# Patient Record
Sex: MALE | Race: WHITE | Employment: FULL TIME | URBAN - NONMETROPOLITAN AREA
[De-identification: names, ages, dates, MRNs, and addresses within clinical notes are randomized per-mention and may not be internally consistent; named-entity substitution may affect disease eponyms.]

---

## 2020-09-16 ENCOUNTER — HOSPITAL ENCOUNTER (EMERGENCY)
Age: 35
Discharge: HOME | End: 2020-09-16
Attending: EMERGENCY MEDICINE
Payer: COMMERCIAL

## 2020-09-16 VITALS
DIASTOLIC BLOOD PRESSURE: 112 MMHG | OXYGEN SATURATION: 99 % | BODY MASS INDEX: 37.03 KG/M2 | HEART RATE: 84 BPM | WEIGHT: 250 LBS | TEMPERATURE: 97.6 F | HEIGHT: 69 IN | SYSTOLIC BLOOD PRESSURE: 147 MMHG | RESPIRATION RATE: 17 BRPM

## 2020-09-16 PROCEDURE — 6370000000 HC RX 637 (ALT 250 FOR IP): Performed by: EMERGENCY MEDICINE

## 2020-09-16 PROCEDURE — 99282 EMERGENCY DEPT VISIT SF MDM: CPT

## 2020-09-16 RX ORDER — PREDNISONE 20 MG/1
40 TABLET ORAL ONCE
Status: COMPLETED | OUTPATIENT
Start: 2020-09-16 | End: 2020-09-16

## 2020-09-16 RX ORDER — METHYLPREDNISOLONE 4 MG/1
TABLET ORAL
Qty: 1 KIT | Refills: 0 | Status: ON HOLD | OUTPATIENT
Start: 2020-09-16 | End: 2020-09-24 | Stop reason: HOSPADM

## 2020-09-16 RX ADMIN — PREDNISONE 40 MG: 20 TABLET ORAL at 04:22

## 2020-09-16 ASSESSMENT — PAIN SCALES - GENERAL: PAINLEVEL_OUTOF10: 7

## 2020-09-16 ASSESSMENT — PAIN DESCRIPTION - PAIN TYPE: TYPE: ACUTE PAIN

## 2020-09-16 ASSESSMENT — PAIN DESCRIPTION - PROGRESSION: CLINICAL_PROGRESSION: NOT CHANGED

## 2020-09-16 ASSESSMENT — PAIN DESCRIPTION - DESCRIPTORS: DESCRIPTORS: ITCHING

## 2020-09-16 NOTE — ED PROVIDER NOTES
Emergency Department Attending Note    Ike Carrillo MD    Date of ED VIsit: 9/16/2020    CHIEF COMPLAINT  Poison Graciela (C/O \"playing in the woods Sunday, got poison Ivy all over\")      HISTORY OF PRESENT ILLNESS  Amy Gray is a 37 y.o. male  With Vital signs of There were no vitals taken for this visit. who presents to the ED with a complaint of poison ivy/itchy rash. Patient seen and evaluated in room 6. Patient states he got into the woods this weekend with his kids and thinks he may have contacted poison ivy. As the rash erupted on Monday and has been continuous since then he has not been able to sleep because of the itch. He is got no systemic symptoms with this. He is got a diffuse rash that is consistent with contact dermatitis that includes his face his groin his legs and his arms. Patient states he has had poison ivy before and this is exactly how it has looked in the past.  No shortness of breath. .  No other complaints, modifying factors or associated symptoms. Patients Past medical history reviewed and listed below  No past medical history on file. No past surgical history on file. I have reviewed the following from the nursing documentation. No family history on file.   Social History     Socioeconomic History    Marital status:      Spouse name: Not on file    Number of children: Not on file    Years of education: Not on file    Highest education level: Not on file   Occupational History    Not on file   Social Needs    Financial resource strain: Not on file    Food insecurity     Worry: Not on file     Inability: Not on file    Transportation needs     Medical: Not on file     Non-medical: Not on file   Tobacco Use    Smoking status: Current Every Day Smoker     Packs/day: 0.50   Substance and Sexual Activity    Alcohol use: No    Drug use: No    Sexual activity: Not on file   Lifestyle    Physical activity     Days per week: Not on file     Minutes per session: Not on file    Stress: Not on file   Relationships    Social connections     Talks on phone: Not on file     Gets together: Not on file     Attends Voodoo service: Not on file     Active member of club or organization: Not on file     Attends meetings of clubs or organizations: Not on file     Relationship status: Not on file    Intimate partner violence     Fear of current or ex partner: Not on file     Emotionally abused: Not on file     Physically abused: Not on file     Forced sexual activity: Not on file   Other Topics Concern    Not on file   Social History Narrative    Not on file     No current facility-administered medications for this encounter. Current Outpatient Medications   Medication Sig Dispense Refill    diclofenac (VOLTAREN) 50 MG EC tablet Take 1 tablet by mouth 2 times daily. 20 tablet 0     No Known Allergies    REVIEW OF SYSTEMS  10 systems reviewed, pertinent positives per HPI otherwise noted to be negative     PHYSICAL EXAM  There were no vitals taken for this visit. GENERAL APPEARANCE: Awake and alert. Cooperative. In no obvious distress. HEAD: Normocephalic. Atraumatic. EYES: PERRL. EOM's grossly intact. Slightly puffy  ENT: Mucous membranes are pink and moist.   NECK: Supple. HEART: RRR. No murmurs. LUNGS: Respirations unlabored. CTAB. Good air exchange. ABDOMEN: Soft. Non-distended. Non-tender. No masses. No organomegaly. No guarding or rebound. EXTREMITIES: No peripheral edema. Moves all extremities equally. All extremities neurovascularly intact. SKIN: Warm and dry. No acute rashes. Diffuse urticarial type contact dermatitis rash  NEUROLOGICAL: Alert and oriented. Strength 5/5, sensation intact. Gait normal.   PSYCHIATRIC: Normal mood and affect. No HI or SI expressed to me.     RADIOLOGY    If acquired see below     EKG:     If acquired see below       ED COURSE/MDM    Rash is definitely consistent with is the story is as well with contact dermatitis

## 2020-09-16 NOTE — LETTER
[facility] Emergency Department  [address] [address]  Phone: [phone]               September 16, 2020    Patient: Nazario Heard   YOB: 1985   Date of Visit: 9/16/2020       To Whom It May Concern:    Christine Moses was seen and treated in our emergency department on 9/16/2020. He may return to work on 9/17/20.       Sincerely,       Long Saldivar RN         Signature:__________________________________

## 2020-09-18 ENCOUNTER — HOSPITAL ENCOUNTER (INPATIENT)
Age: 35
LOS: 5 days | Discharge: HOME | DRG: 885 | End: 2020-09-24
Attending: STUDENT IN AN ORGANIZED HEALTH CARE EDUCATION/TRAINING PROGRAM | Admitting: PSYCHIATRY & NEUROLOGY
Payer: COMMERCIAL

## 2020-09-18 LAB
A/G RATIO: 1.6 (ref 1.1–2.2)
ACETAMINOPHEN LEVEL: <5 UG/ML (ref 10–30)
ALBUMIN SERPL-MCNC: 5.4 G/DL (ref 3.4–5)
ALP BLD-CCNC: 92 U/L (ref 40–129)
ALT SERPL-CCNC: 91 U/L (ref 10–40)
AMPHETAMINE SCREEN, URINE: NORMAL
ANION GAP SERPL CALCULATED.3IONS-SCNC: 16 MMOL/L (ref 3–16)
AST SERPL-CCNC: 44 U/L (ref 15–37)
BARBITURATE SCREEN URINE: NORMAL
BASOPHILS ABSOLUTE: 0.2 K/UL (ref 0–0.2)
BASOPHILS RELATIVE PERCENT: 1 %
BENZODIAZEPINE SCREEN, URINE: NORMAL
BILIRUB SERPL-MCNC: 0.4 MG/DL (ref 0–1)
BUN BLDV-MCNC: 8 MG/DL (ref 7–20)
CALCIUM SERPL-MCNC: 9.1 MG/DL (ref 8.3–10.6)
CANNABINOID SCREEN URINE: NORMAL
CHLORIDE BLD-SCNC: 98 MMOL/L (ref 99–110)
CO2: 20 MMOL/L (ref 21–32)
COCAINE METABOLITE SCREEN URINE: NORMAL
CREAT SERPL-MCNC: 0.9 MG/DL (ref 0.9–1.3)
EOSINOPHILS ABSOLUTE: 0.5 K/UL (ref 0–0.6)
EOSINOPHILS RELATIVE PERCENT: 2.9 %
ETHANOL: 185 MG/DL (ref 0–0.08)
GFR AFRICAN AMERICAN: >60
GFR NON-AFRICAN AMERICAN: >60
GLOBULIN: 3.3 G/DL
GLUCOSE BLD-MCNC: 110 MG/DL (ref 70–99)
HCT VFR BLD CALC: 45.1 % (ref 40.5–52.5)
HEMOGLOBIN: 15.1 G/DL (ref 13.5–17.5)
LYMPHOCYTES ABSOLUTE: 3.5 K/UL (ref 1–5.1)
LYMPHOCYTES RELATIVE PERCENT: 18.8 %
Lab: NORMAL
MCH RBC QN AUTO: 30 PG (ref 26–34)
MCHC RBC AUTO-ENTMCNC: 33.5 G/DL (ref 31–36)
MCV RBC AUTO: 89.6 FL (ref 80–100)
METHADONE SCREEN, URINE: NORMAL
MONOCYTES ABSOLUTE: 1.2 K/UL (ref 0–1.3)
MONOCYTES RELATIVE PERCENT: 6.3 %
NEUTROPHILS ABSOLUTE: 13.1 K/UL (ref 1.7–7.7)
NEUTROPHILS RELATIVE PERCENT: 71 %
OPIATE SCREEN URINE: NORMAL
OXYCODONE URINE: NORMAL
PDW BLD-RTO: 13.4 % (ref 12.4–15.4)
PH UA: 5
PHENCYCLIDINE SCREEN URINE: NORMAL
PLATELET # BLD: 342 K/UL (ref 135–450)
PMV BLD AUTO: 7.7 FL (ref 5–10.5)
POTASSIUM SERPL-SCNC: 3.4 MMOL/L (ref 3.5–5.1)
PROPOXYPHENE SCREEN: NORMAL
RBC # BLD: 5.03 M/UL (ref 4.2–5.9)
SALICYLATE, SERUM: <0.3 MG/DL (ref 15–30)
SODIUM BLD-SCNC: 134 MMOL/L (ref 136–145)
TOTAL PROTEIN: 8.7 G/DL (ref 6.4–8.2)
WBC # BLD: 18.4 K/UL (ref 4–11)

## 2020-09-18 PROCEDURE — G0480 DRUG TEST DEF 1-7 CLASSES: HCPCS

## 2020-09-18 PROCEDURE — 80307 DRUG TEST PRSMV CHEM ANLYZR: CPT

## 2020-09-18 PROCEDURE — 99285 EMERGENCY DEPT VISIT HI MDM: CPT

## 2020-09-18 PROCEDURE — 85025 COMPLETE CBC W/AUTO DIFF WBC: CPT

## 2020-09-18 PROCEDURE — 80053 COMPREHEN METABOLIC PANEL: CPT

## 2020-09-18 RX ORDER — POTASSIUM CHLORIDE 20 MEQ/1
20 TABLET, EXTENDED RELEASE ORAL ONCE
Status: COMPLETED | OUTPATIENT
Start: 2020-09-18 | End: 2020-09-19

## 2020-09-19 PROBLEM — D72.829 LEUCOCYTOSIS: Status: ACTIVE | Noted: 2020-09-19

## 2020-09-19 PROBLEM — R03.0 ELEVATED BLOOD-PRESSURE READING WITHOUT DIAGNOSIS OF HYPERTENSION: Status: ACTIVE | Noted: 2020-09-19

## 2020-09-19 PROBLEM — F32.A DEPRESSION: Status: ACTIVE | Noted: 2020-09-19

## 2020-09-19 PROBLEM — L23.7 POISON IVY DERMATITIS: Status: ACTIVE | Noted: 2020-09-19

## 2020-09-19 PROBLEM — F33.2 SEVERE EPISODE OF RECURRENT MAJOR DEPRESSIVE DISORDER, WITHOUT PSYCHOTIC FEATURES (HCC): Status: ACTIVE | Noted: 2020-09-19

## 2020-09-19 PROBLEM — Z00.00 ROUTINE GENERAL MEDICAL EXAMINATION AT A HEALTH CARE FACILITY: Status: ACTIVE | Noted: 2020-09-19

## 2020-09-19 PROBLEM — E87.6 HYPOKALEMIA: Status: ACTIVE | Noted: 2020-09-19

## 2020-09-19 LAB
ETHANOL: 65 MG/DL (ref 0–0.08)
SARS-COV-2, NAAT: NOT DETECTED
TSH SERPL DL<=0.05 MIU/L-ACNC: 2.2 UIU/ML (ref 0.27–4.2)

## 2020-09-19 PROCEDURE — G0480 DRUG TEST DEF 1-7 CLASSES: HCPCS

## 2020-09-19 PROCEDURE — 6370000000 HC RX 637 (ALT 250 FOR IP): Performed by: PSYCHIATRY & NEUROLOGY

## 2020-09-19 PROCEDURE — 36415 COLL VENOUS BLD VENIPUNCTURE: CPT

## 2020-09-19 PROCEDURE — 99222 1ST HOSP IP/OBS MODERATE 55: CPT | Performed by: PHYSICIAN ASSISTANT

## 2020-09-19 PROCEDURE — U0002 COVID-19 LAB TEST NON-CDC: HCPCS

## 2020-09-19 PROCEDURE — 1240000000 HC EMOTIONAL WELLNESS R&B

## 2020-09-19 PROCEDURE — 99223 1ST HOSP IP/OBS HIGH 75: CPT | Performed by: PSYCHIATRY & NEUROLOGY

## 2020-09-19 PROCEDURE — 84443 ASSAY THYROID STIM HORMONE: CPT

## 2020-09-19 PROCEDURE — 6360000002 HC RX W HCPCS: Performed by: PSYCHIATRY & NEUROLOGY

## 2020-09-19 PROCEDURE — 6370000000 HC RX 637 (ALT 250 FOR IP)

## 2020-09-19 PROCEDURE — 6370000000 HC RX 637 (ALT 250 FOR IP): Performed by: STUDENT IN AN ORGANIZED HEALTH CARE EDUCATION/TRAINING PROGRAM

## 2020-09-19 PROCEDURE — 6370000000 HC RX 637 (ALT 250 FOR IP): Performed by: EMERGENCY MEDICINE

## 2020-09-19 RX ORDER — TRAZODONE HYDROCHLORIDE 50 MG/1
50 TABLET ORAL NIGHTLY PRN
Status: DISCONTINUED | OUTPATIENT
Start: 2020-09-19 | End: 2020-09-24 | Stop reason: HOSPADM

## 2020-09-19 RX ORDER — ESCITALOPRAM OXALATE 10 MG/1
10 TABLET ORAL DAILY
Status: DISCONTINUED | OUTPATIENT
Start: 2020-09-19 | End: 2020-09-24 | Stop reason: HOSPADM

## 2020-09-19 RX ORDER — ACETAMINOPHEN 325 MG/1
650 TABLET ORAL EVERY 4 HOURS PRN
Status: DISCONTINUED | OUTPATIENT
Start: 2020-09-19 | End: 2020-09-24 | Stop reason: HOSPADM

## 2020-09-19 RX ORDER — BENZTROPINE MESYLATE 1 MG/ML
2 INJECTION INTRAMUSCULAR; INTRAVENOUS 2 TIMES DAILY PRN
Status: DISCONTINUED | OUTPATIENT
Start: 2020-09-19 | End: 2020-09-24 | Stop reason: HOSPADM

## 2020-09-19 RX ORDER — MAGNESIUM HYDROXIDE/ALUMINUM HYDROXICE/SIMETHICONE 120; 1200; 1200 MG/30ML; MG/30ML; MG/30ML
30 SUSPENSION ORAL EVERY 6 HOURS PRN
Status: DISCONTINUED | OUTPATIENT
Start: 2020-09-19 | End: 2020-09-24 | Stop reason: HOSPADM

## 2020-09-19 RX ORDER — METHYLPREDNISOLONE 4 MG/1
4 TABLET ORAL DAILY
Status: COMPLETED | OUTPATIENT
Start: 2020-09-19 | End: 2020-09-20

## 2020-09-19 RX ORDER — ONDANSETRON 4 MG/1
4 TABLET, ORALLY DISINTEGRATING ORAL ONCE
Status: COMPLETED | OUTPATIENT
Start: 2020-09-19 | End: 2020-09-19

## 2020-09-19 RX ORDER — OLANZAPINE 10 MG/1
10 INJECTION, POWDER, LYOPHILIZED, FOR SOLUTION INTRAMUSCULAR
Status: ACTIVE | OUTPATIENT
Start: 2020-09-19 | End: 2020-09-19

## 2020-09-19 RX ORDER — IBUPROFEN 400 MG/1
400 TABLET ORAL EVERY 6 HOURS PRN
Status: DISCONTINUED | OUTPATIENT
Start: 2020-09-19 | End: 2020-09-24 | Stop reason: HOSPADM

## 2020-09-19 RX ORDER — ONDANSETRON 4 MG/1
TABLET, ORALLY DISINTEGRATING ORAL
Status: COMPLETED
Start: 2020-09-19 | End: 2020-09-19

## 2020-09-19 RX ORDER — IBUPROFEN 600 MG/1
600 TABLET ORAL ONCE
Status: COMPLETED | OUTPATIENT
Start: 2020-09-19 | End: 2020-09-19

## 2020-09-19 RX ORDER — SILDENAFIL 100 MG/1
50 TABLET, FILM COATED ORAL DAILY PRN
Status: ON HOLD | COMMUNITY
Start: 2020-08-26 | End: 2020-09-24 | Stop reason: HOSPADM

## 2020-09-19 RX ORDER — FLUTICASONE PROPIONATE 50 MCG
2 SPRAY, SUSPENSION (ML) NASAL DAILY
Status: ON HOLD | COMMUNITY
Start: 2019-02-20 | End: 2020-09-24 | Stop reason: HOSPADM

## 2020-09-19 RX ORDER — METHYLPREDNISOLONE 4 MG/1
TABLET ORAL
Status: ON HOLD | COMMUNITY
Start: 2020-06-29 | End: 2020-09-24 | Stop reason: HOSPADM

## 2020-09-19 RX ORDER — BUPROPION HYDROCHLORIDE 75 MG/1
75 TABLET ORAL 2 TIMES DAILY
Status: ON HOLD | COMMUNITY
End: 2020-09-24 | Stop reason: HOSPADM

## 2020-09-19 RX ORDER — OLANZAPINE 10 MG/1
10 TABLET ORAL
Status: ACTIVE | OUTPATIENT
Start: 2020-09-19 | End: 2020-09-19

## 2020-09-19 RX ADMIN — ONDANSETRON 4 MG: 4 TABLET, ORALLY DISINTEGRATING ORAL at 04:43

## 2020-09-19 RX ADMIN — METHYLPREDNISOLONE 4 MG: 4 TABLET ORAL at 21:23

## 2020-09-19 RX ADMIN — ESCITALOPRAM OXALATE 10 MG: 10 TABLET ORAL at 17:36

## 2020-09-19 RX ADMIN — POTASSIUM CHLORIDE 20 MEQ: 1500 TABLET, EXTENDED RELEASE ORAL at 00:20

## 2020-09-19 RX ADMIN — IBUPROFEN 600 MG: 600 TABLET, FILM COATED ORAL at 04:41

## 2020-09-19 ASSESSMENT — LIFESTYLE VARIABLES
HISTORY_ALCOHOL_USE: NO
HISTORY_ALCOHOL_USE: NO

## 2020-09-19 ASSESSMENT — PATIENT HEALTH QUESTIONNAIRE - PHQ9
SUM OF ALL RESPONSES TO PHQ QUESTIONS 1-9: 5
SUM OF ALL RESPONSES TO PHQ QUESTIONS 1-9: 2

## 2020-09-19 ASSESSMENT — SLEEP AND FATIGUE QUESTIONNAIRES
DO YOU HAVE DIFFICULTY SLEEPING: NO
DO YOU USE A SLEEP AID: NO
AVERAGE NUMBER OF SLEEP HOURS: 8
AVERAGE NUMBER OF SLEEP HOURS: 8
DO YOU HAVE DIFFICULTY SLEEPING: NO
DO YOU USE A SLEEP AID: NO

## 2020-09-19 ASSESSMENT — PAIN SCALES - GENERAL: PAINLEVEL_OUTOF10: 9

## 2020-09-19 NOTE — ED NOTES
Call placed to Dr. Chauncy Hamman to update him on collateral obtained by Hyun Serna. Message left awaiting call back.       Patric Ortiz RN  09/19/20 [id number]
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
## 2020-09-19 NOTE — ED NOTES
Patient take to [facility] per Sachi PONCE and Security.       Perico Hale RN  09/19/20 [id number]
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
## 2020-09-19 NOTE — ED NOTES
Report from Anne Selby RN this RN to assume care for this pt at this time      Ashley De La Vega RN  09/19/20 0363

## 2020-09-19 NOTE — BH NOTE
( ) Patient refused counseling  ( ) Patient has not smoked in the last 30 days    Metabolic Screening:    No results found for: LABA1C    No results found for: CHOL  No results found for: TRIG  No results found for: HDL  No components found for: LDLCAL  No results found for: LABVLDL      Body mass index is 36.92 kg/m². BP Readings from Last 2 Encounters:   09/19/20 (!) 140/94   09/16/20 (!) 147/112           Pt admitted with followings belongings:  Dentures: None  Vision - Corrective Lenses: None  Hearing Aid: None  Jewelry: None  Body Piercings Removed: N/A  Clothing: Pants, Shirt, Footwear  Were All Patient Medications Collected?: Not Applicable  Other Valuables: Cell phone, Wallet(61.00, sunglasses)     Patient oriented to surroundings and program expectations and copy of patient rights given. Received admission packet:  YES. Consents reviewed, signed YES Patient verbalize understanding:  YES.     Patient education on precautions: Rina Telles RN
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
## 2020-09-19 NOTE — ED NOTES
Patient bed ready on [facility]. Malina ARCHULETA updated about patient.       Jethro Butt RN  09/19/20 [id number]

## 2020-09-19 NOTE — FLOWSHEET NOTE
09/19/20 1128   Activities of Daily Living   Patient Requires assistance with daily self-care activities? No   Leisure Activity 1   3 Favorite Leisure Activities fishing   Frequency monthly   Last time in the last 3 months   Barriers to participating    (none)   Leisure Activity 2   [address]  spending time with my kids   Frequency  > 2 hours/day   Last time  this week   Barriers to participating    (none)   Leisure Activity 3   Favorite Leisure Activities  shooting my guns   Frequency  weekly   Last time  this week   Barriers to participating    (none reported)   Social   Patient reports spending the majority of their free time with a group   Patient verbalizes a preference for spending free time with a group   Patients perception of support system less healthy  (reports that his parents are \"hostile\" and not supportive)   Patients perception of barriers to socializing with others include(s) no perceived barriers   Social Details patient reports that he sees his children daily and also has friends who are supportive.  He is attempting to distance himself from his parents, who he states are \"hostile\"   Beliefs & Coping   Has difficulty dealing with feelings   Yes   Internalizes feelings/Keeps feelings in Yes   Externalizes feelings through aggressiveness or poor temper control  No   Feels uncomfortable around others  No   Has difficulty talking to others  No   Depends on others for direction or decisions No   Difficulty dealing with anger of others  No   Difficulty dealing with own anger  No   Difficulty managing stress No   Frequently has difficulty with relationships  No   Has recently perceived/experienced loss, disappointment, humiliation or failure  Yes  (patient is going through a divorce, which he has accepted, but he still wants to be able to see his children regularly)   General perception about self likes self   Attitude about abilities generally perceives abilities as: always NEPHROLOGY PROGRESS NOTE     PATIENT INFORMATION     Name: Jo Villanueva   Age & Sex: 56 y o  female   MRN: [id number]  Hospital Stay Days: 1  Unit/Bed#: -01   Encounter: [id number]    ASSESSMENT/PLAN     Principal Problem:    UTI (urinary tract infection)  Active Problems:    Acquired hypothyroidism    Controlled type 1 diabetes mellitus with neurological manifestations ([place] [address] )    Essential hypertension    Anemia    Status post kidney transplant    Major depressive disorder, recurrent episode, in full remission ([place] [address] )    Multiple myeloma not having achieved remission ([place] [address] )    Acute renal failure superimposed on stage 4 chronic kidney disease ([place] [address] )    Acute metabolic encephalopathy    Metabolic acidosis    EDUARDO POA on CKD stage 4, baseline creatinine 1 9 to 2 1, follows with Dr Yash Dalton  Likely prerenal etiology due to poor oral intake over the last 2 days complicated with recurrent UTI  Will likely need long term PPx due to several UTI episodes  -recently had an episode of EDUARDO with peak creatinine 4 4 on 9/9/19 improved to 2 8 on discharge  Creatinine continue to slowly improved to 2 7 yesterday and plateau today at 1 15  -EDUARDO recently thought to be multifactorial including prerenal/ATN/?  AIN; now current episode of UTI likely contributing as well  -start IV bicarb drip as below, repeat BMP today shows Bicarb 15, non anion gap MA  -avoid nephrotoxins or NSAIDs  -Post void bladder scan shows 51mL   -transplant kidney ultrasound this admission shows normal size, normal echogenicity, no hydronephrosis    Slightly increased resistive indices although renal artery and renal vein patent   -recent peripheral eosinophilia earlier this month now seems to have improved  -urinalysis suggestive of UTI along with her urinary complaint     Likely UTI based on urinalysis and her urinary complaint of dysuria, urinary incontinence on presentation   -given recurrent UTI episodes in immunocompromised status   -IV antibiotic 1g rocephin as per primary team, ID input appreciated     Low bicarb this could be secondary to presumed metabolic acidosis in the setting of advanced renal failure along with new dx of MM  -will start bicarb drip as mentioned below, will increase rate to 70 mL/hour  -Bicarb remains low at 15  Non anion gap metabolic acidosis, likely in the setting of confirmed dx of multiple myeloma (April 2019)  -Continue Bicarb for now     Anemia, closely monitor hemoglobin  -Hgb 8 4 yesterday, denies anemia related symptoms, no signs of active bleeding   -if transfusion needed, consider all transplant precautions  -f/u H/H today     Hypertension, chronic  -blood pressure remains elevated w SBP 150s  -outpatient regimen includes amlodipine 10 mg in a m , 5 mg in p m , clonidine 0 3 mg p o  T i d , doxazosin 4 mg daily, hydralazine 50 mg t i d , metoprolol 50 mg b i d   -restart all antihypertensive medications with holding parameter  -goal SBP 140s for now  -avoid hypotension      Status post renal and pancreas transplant in 1998  -chronic allograft nephropathy, currently minutes suppression includes Prograf 3 mg in a m  And 2 mg in p m  And prednisone 5 mg daily  -continue same  -monitor Prograf level in a m   -last tacrolimus level 3 4, goal level around 4  -pending level for today    SUBJECTIVE     Patient seen and examined  No acute events overnight  Pt is A& O x4 however mildly anxious (baseline as per pt)  Pt states that she is feeling much better today without complaints of burning or pain with urination  Denies blood in urine  Denies suprapubic tenderness  Admits to mild nausea  Also admits to constipation with last BM 4days ago  States that she takes dulcolax at home  Denies SOB, Cp, HA, blurred vision, abd pain, LE Edema/ pain  ROS negative for 12 organ systems except per HPI      OBJECTIVE     Vitals:    10/01/19 2214 10/02/19 0531 10/02/19 0548 10/02/19 0700   BP: 165/64 157/62  156/70   BP Location: successful   Locus of Control  always   Belief about recovery Recovery is possible   Patient Identified Strengths  \"My kids love me, I work\"   Patient Identified Limitations  \"I just need to do whatever it is that I need to in order to make sure my kids are ok and that I can have a relationship with them. I want to take care of myself\"   Perception of most stressful event prior to hospitalization Patient states that he made a poor choice when drinking two bottles of wine. He states that he was upset that he was not completely honest with a new female that he has been talking to and went outside to shoot his gun, but that was a bad decision. He states that he does not need to be hospitalized, but he is willing to do whatever it takes for his children to be ok and not scared of him   Perception of changes needed Patient states that he would like to get on an antidepressant that actually helps, to continue to have good relationships with and see his kids, and to maintain coparenting with his estranged wife     Writer completed leisure assessment. Patient reports that he enjoys shooting his guns, fishing, and spending time with his children. He works full time and is going through a divorce with his wife, but they have been able to be friends and to co-parent their children effectively. He states that he does not necessarily believe in God or [employer], just in [employer] [facility] a good person and raising your kids right. \" Patient has an internal locus of control and is hopeful about the future.      AZEEM Hernandez Right arm   Pulse: 65   61   Resp:    17   Temp: 98 °F (36 7 °C)   (!) 97 3 °F (36 3 °C)   TempSrc:    Oral   SpO2: 96%   99%   Weight:   100 kg (221 lb 5 5 oz)    Height:          Temperature:   Temp (24hrs), Av 1 °F (36 7 °C), Min:97 3 °F (36 3 °C), Max:99 °F (37 2 °C)    Temperature: (!) 97 3 °F (36 3 °C)  Intake & Output:  I/O        07 - 10/01 0700 10/01 0701 - 10/02 0700 10/02 07 - 10/03 0700    P  O   520     Total Intake(mL/kg)  520 (5 2)     Urine (mL/kg/hr)  250 150 (0 4)    Total Output  250 150    Net  +270 -150               Weights:   IBW: 63 9 kg    Body mass index is 33 65 kg/m²  Weight (last 2 days)     Date/Time   Weight    10/02/19 0548   100 (221 34)    10/01/19 17:14:56   99 3 (218 92)    10/01/19 1208   101 (222)            Physical Exam   Constitutional: She is oriented to person, place, and time  She appears well-developed and well-nourished  No distress  HENT:   Head: Normocephalic and atraumatic  Right Ear: External ear normal    Left Ear: External ear normal    Nose: Nose normal    Mouth/Throat: Oropharynx is clear and moist    Eyes: Pupils are equal, round, and reactive to light  Conjunctivae and EOM are normal    Neck: Normal range of motion  Neck supple  No JVD present  No tracheal deviation present  Cardiovascular: Normal rate, regular rhythm, normal heart sounds and intact distal pulses  Exam reveals no gallop and no friction rub  No murmur heard  Pulmonary/Chest: Effort normal and breath sounds normal  No stridor  No respiratory distress  She has no wheezes  She has no rales  She exhibits no tenderness  Abdominal: Soft  Bowel sounds are normal  She exhibits no distension  There is tenderness (mild tenderness to palpation on left side of lower abdomen )  Musculoskeletal: Normal range of motion  She exhibits no edema, tenderness or deformity  Lymphadenopathy:     She has no cervical adenopathy     Neurological: She is alert and oriented to person, place, and time  No cranial nerve deficit  Coordination normal    Skin: Skin is warm and dry  Capillary refill takes less than 2 seconds  No rash noted  She is not diaphoretic  No erythema  No pallor  Psychiatric: Her behavior is normal  Judgment and thought content normal    Mildly anxious   Nursing note and vitals reviewed  LABORATORY DATA     Labs: I have personally reviewed pertinent reports  Results from last 7 days   Lab Units 10/01/19  1222   WBC Thousand/uL 8 58   HEMOGLOBIN g/dL 8 4*   HEMATOCRIT % 27 6*   PLATELETS Thousands/uL 168   NEUTROS PCT % 74   MONOS PCT % 5      Results from last 7 days   Lab Units 10/02/19  0444 10/01/19  1222   POTASSIUM mmol/L 4 5 4 6   CHLORIDE mmol/L 117* 118*   CO2 mmol/L 15* 15*   BUN mg/dL 36* 38*   CREATININE mg/dL 2 75* 2 70*   CALCIUM mg/dL 8 7 9 2   ALK PHOS U/L  --  119*   ALT U/L  --  15   AST U/L  --  8     Results from last 7 days   Lab Units 10/02/19  0444   MAGNESIUM mg/dL 2 1     Results from last 7 days   Lab Units 10/02/19  0444   PHOSPHORUS mg/dL 4 4          Results from last 7 days   Lab Units 10/01/19  1222   LACTIC ACID mmol/L 0 5           IMAGING & DIAGNOSTIC TESTING     Radiology Results: I have personally reviewed pertinent reports  Us Transplant Kidney With Doppler    Result Date: 10/1/2019  Impression: Arterial resistive indices within the transplant lower pole renal parenchyma and transplant renal artery measuring between 0 8 and 0 9, previously resistive indices measuring up to 0 8 in early September  Transplant renal artery and transplant renal vein are patent  Spectral analysis of the transplant renal artery does not suggest stenosis  Findings are more concerning for developing transplant rejection  No hydronephrosis  Simple renal cysts within both the left pelvic transplant kidney and right native kidney  Left native kidney not reliably identified,  known to be significantly atrophic  The study was marked in [facility] for immediate notification  Workstation performed: [id number]     Other Diagnostic Testing: I have personally reviewed pertinent reports        ACTIVE MEDICATIONS     Current Facility-Administered Medications   Medication Dose Route Frequency    acetaminophen (TYLENOL) tablet 650 mg  650 mg Oral Q6H PRN    amLODIPine (NORVASC) tablet 10 mg  10 mg Oral QAM    amLODIPine (NORVASC) tablet 5 mg  5 mg Oral QPM    ARIPiprazole (ABILIFY) tablet 30 mg  30 mg Oral HS    aspirin chewable tablet 81 mg  81 mg Oral Daily    bisacodyl (DULCOLAX) EC tablet 5 mg  5 mg Oral Daily PRN    busPIRone (BUSPAR) tablet 5 mg  5 mg Oral BID    calcium carbonate (TUMS) chewable tablet 1,000 mg  1,000 mg Oral Daily PRN    cefTRIAXone (ROCEPHIN) 1,000 mg in dextrose 5 % 50 mL IVPB  1,000 mg Intravenous Q24H    cloNIDine (CATAPRES) tablet 0 3 mg  0 3 mg Oral Q8H [address]    doxazosin (CARDURA) tablet 4 mg  4 mg Oral HS    DULoxetine (CYMBALTA) delayed release capsule 60 mg  60 mg Oral BID    ferrous sulfate tablet 325 mg  325 mg Oral Daily With Breakfast    folic acid (FOLVITE) tablet 1,000 mcg  1,000 mcg Oral Daily    heparin (porcine) subcutaneous injection 5,000 Units  5,000 Units Subcutaneous Q8H [address]    hydrALAZINE (APRESOLINE) tablet 50 mg  50 mg Oral Q8H [address]    insulin glargine (LANTUS) subcutaneous injection 1 Units 0 01 mL  1 Units Subcutaneous HS    insulin lispro (HumaLOG) 100 units/mL subcutaneous injection 1-5 Units  1-5 Units Subcutaneous HS    insulin lispro (HumaLOG) 100 units/mL subcutaneous injection 1-6 Units  1-6 Units Subcutaneous TID AC    levothyroxine tablet 125 mcg  125 mcg Oral Early Morning    LORazepam (ATIVAN) tablet 2 mg  2 mg Oral TID PRN    metoprolol tartrate (LOPRESSOR) tablet 50 mg  50 mg Oral Q12H ARUNA    ondansetron (ZOFRAN) injection 4 mg  4 mg Intravenous Q6H PRN    pravastatin (PRAVACHOL) tablet 80 mg  80 mg Oral Daily With Dinner    predniSONE tablet 5 mg  5 mg Oral Daily    rOPINIRole (REQUIP) tablet 0 25 mg 0 25 mg Oral BID    sertraline (ZOLOFT) tablet 200 mg  200 mg Oral Daily    sevelamer (RENAGEL) tablet 800 mg  800 mg Oral TID With Meals    sodium bicarbonate 150 mEq in dextrose 5 % 1,000 mL infusion  70 mL/hr Intravenous Continuous    tacrolimus (PROGRAF) capsule 2 mg  2 mg Oral QPM    tacrolimus (PROGRAF) capsule 3 mg  3 mg Oral QAM     VTE Pharmacologic Prophylaxis: Heparin  VTE Mechanical Prophylaxis: sequential compression device    ==  Lolly Vinson MD  Resident, PGY-1  [address] Internal Medicine Residency

## 2020-09-19 NOTE — ED NOTES
Handoff report to [facility] pt ambulatory to [facility] room 2 with a steady gate RN at pt side     Audrey Scott RN  09/19/20 6390

## 2020-09-19 NOTE — ED NOTES
Level of Care Disposition: Admit      Patient was seen by ED provider and [facility]FF[facility] staff. The case presented to psychiatric provider on-call Dr. Keyana Thomas. Based on the ED evaluation and information presented to the provider by this nurse it was determined that inpatient hospitalization is the least restrictive environment for the patient at this time. The patient will be admitted to the inpatient unit. Admitting provider did not order suicide precautions based on patient able to contract for safety. RATIONALE FOR ADMISSION:   Patient has a mental illness: Depression  Patient at imminent risk of danger to self as demonstrated by threats of suicide , shot gun erratically. Patient at imminent risk of danger toward others demonstrated by shooting gun erratically.     Patient is at imminent risk of violating their own rights or the rights of others demonstrated by AND they could benefit from psychiatric treatment                 Citlaly Turcios RN  09/19/20 1247

## 2020-09-19 NOTE — FLOWSHEET NOTE
09/19/20 1049   Psychiatric History   Psychiatric history treatment   (patient denies any treatment)   Are there any medication issues? Yes  (states that he stopped taking antidepressant due to the side effects)   Support System   Support system None   Problems in support system Other (Comment); Alienated/estranged  (states that his family is very hostile)   Current Living Situation   Home Living Adequate  (is living in a camper on his property so that he can be near his children, as he and his wife will be )   Living information Lives alone   Problems with living situation  No   Lack of basic needs No   SSDI/SSI none   Other government assistance none reported   Problems with environment patient denies   Current abuse issues patient denies   Supervised setting None   Relationship problems Yes   Relationship problems due to  Divorce/Separation  (he and wife have been  for about a month)   Contact information [address] [place] [address] and Self-Care Issues   Relevant medical problems patient denies   Relevant self-care issues patient denies   Barriers to treatment No   Family Constellation   Spouse/partner-name/age Chioma-estranged wife, age 32   Children-names/ages Rayla-age 5, Eulalio-age 7, Ariyah-age 5   Parents Laura Gonsalves and Maria Del Rosario Crisostomo   Siblings 1 brother, 2 sisters   Contact information Brittany Thomason wife [phone]   Support services   (none)   Comment \"I will do whatever I have to\"   Childhood   Raised by Biological father;Biological mother   Biological mother Clara Madill father Howard Hyatt family history brother is an alcoholic   History of abuse No   Legal History   Legal history No   Other relevant legal issues has a date scheduled for divorce next month   Juvenile legal history No    Abuse Assessment   Physical Abuse Denies   Verbal Abuse Denies   Emotional abuse Denies   Financial Abuse Denies   Sexual abuse Denies   Elder abuse No   Substance Use   Use of substances  Yes   Substance 1   Substance used Alcohol   Amount/frequency/route 1 bottle of wine twice per week   Age of first use 14   Last use/History yesterday   Motivation for SA Treatment   Stage of engagement Persuasion  (states that he does not have a problem)   Motivation for treatment No   Current barriers to treatment   (none)   Education   Education [employer] graduate -GED   Special education   (IEP delayed learning)   Work History   Currently employed Yes   Recent job loss or change   (has worked at job for 5 years)    service   (patient denies)   /VA involvement NA   Leisure/Activity   Past interests draw   Present interests fishing, spending time with kids   Current daily activity go to work, make dinner, spend time with kids   Social with friends/family Yes  (with friends)   Cultural and Spiritual   Spiritual concerns No   Cultural concerns No        09/19/20 1049   Psychiatric History   Psychiatric history treatment   (patient denies any treatment)   Are there any medication issues? Yes  (states that he stopped taking antidepressant due to the side effects)   Support System   Support system None   Problems in support system Other (Comment); Alienated/estranged  (states that his family is very hostile)   Current Living Situation   Home Living Adequate  (is living in a camper on his property so that he can be near his children, as he and his wife will be )   Living information Lives alone   Problems with living situation  No   Lack of basic needs No   SSDI/SSI none   Other government assistance none reported   Problems with environment patient denies   Current abuse issues patient denies   Supervised setting None   Relationship problems Yes   Relationship problems due to  Divorce/Separation  (he and wife have been  for about a month)   Contact information [address] [place] [address] and Self-Care Issues   Relevant medical problems patient denies   Relevant self-care issues patient denies   Barriers to treatment No   Family Constellation   Spouse/partner-name/age Chioma-estranged wife, age 32   Children-names/ages Rayla-age 7, [place] Pretzel 5   Parents Sergei Lizama and Kelly Gee   Siblings 1 brother, 2 sisters   Contact information [address] wife [phone]   Support services   (none)   Comment \"I will do whatever I have to\"   Childhood   Raised by Biological father;Biological mother   Biological mother Sarai Quintana father Joseph Noble family history brother is an alcoholic   History of abuse No   Legal History   Legal history No   Other relevant legal issues has a date scheduled for divorce next month   Juvenile legal history No    Abuse Assessment   Physical Abuse Denies   Verbal Abuse Denies   Emotional abuse Denies   Financial Abuse Denies   Sexual abuse Denies   Elder abuse No   Substance Use   Use of substances  Yes   Substance 1   Substance used Alcohol   Amount/frequency/route 1 bottle of wine twice per week   Age of first use 14   Last use/History yesterday   Motivation for SA Treatment   Stage of engagement Persuasion  (states that he does not have a problem)   Motivation for treatment No   Current barriers to treatment   (none)   Education   Education [employer] graduate -GED   Special education   (IEP delayed learning)   Work History   Currently employed Yes   Recent job loss or change   (has worked at job for 5 years)    service   (patient denies)   /VA involvement NA   Leisure/Activity   Past interests draw   Present interests fishing, spending time with kids   Current daily activity go to work, make dinner, spend time with kids   Social with friends/family Yes  (with friends)   Cultural and Spiritual   Spiritual concerns No   Cultural concerns No     Writer completed psychosocial and CSSR risk assessments.  Patient reports that he and his wife are going through a divorce, but he is living in a camper on their property so that he can see his children. He was staying with his parents for about a week, but reports that they are \"hostile\" and he could not stay with them any longer. He reports drinking a bottle of wine about twice a week, but he only gets a buzz and not drunk, but last night, he drank two bottles and this led to the situation that led to hospitalization. Patient reports that he was talking to a new woman and he was not honest about his divorce, which he felt guilty about and drank the two bottles of wine. He reports that he fired a gun on the property and one of the friends staying there called the police. Patient reports that he works full time, denies drug use, and is willing to do whatever treatment is needed for him and his children to be ok and continue to have a good relationship.      AZEEM Ochoa
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
## 2020-09-19 NOTE — ED NOTES
To [facility] [facility], and placed in treatment room B2. Will continue to monitor for pt safety.       Brendon Lucia  09/19/20 7550

## 2020-09-20 LAB
A/G RATIO: 1.6 (ref 1.1–2.2)
ALBUMIN SERPL-MCNC: 4.6 G/DL (ref 3.4–5)
ALP BLD-CCNC: 81 U/L (ref 40–129)
ALT SERPL-CCNC: 72 U/L (ref 10–40)
ANION GAP SERPL CALCULATED.3IONS-SCNC: 7 MMOL/L (ref 3–16)
AST SERPL-CCNC: 29 U/L (ref 15–37)
BILIRUB SERPL-MCNC: 0.6 MG/DL (ref 0–1)
BUN BLDV-MCNC: 12 MG/DL (ref 7–20)
CALCIUM SERPL-MCNC: 9.1 MG/DL (ref 8.3–10.6)
CHLORIDE BLD-SCNC: 100 MMOL/L (ref 99–110)
CHOLESTEROL, TOTAL: 191 MG/DL (ref 0–199)
CO2: 28 MMOL/L (ref 21–32)
CREAT SERPL-MCNC: 0.9 MG/DL (ref 0.9–1.3)
GFR AFRICAN AMERICAN: >60
GFR NON-AFRICAN AMERICAN: >60
GLOBULIN: 2.8 G/DL
GLUCOSE BLD-MCNC: 97 MG/DL (ref 70–99)
HDLC SERPL-MCNC: 40 MG/DL (ref 40–60)
LDL CHOLESTEROL CALCULATED: 110 MG/DL
POTASSIUM REFLEX MAGNESIUM: 4.2 MMOL/L (ref 3.5–5.1)
SODIUM BLD-SCNC: 135 MMOL/L (ref 136–145)
TOTAL PROTEIN: 7.4 G/DL (ref 6.4–8.2)
TRIGL SERPL-MCNC: 205 MG/DL (ref 0–150)
VLDLC SERPL CALC-MCNC: 41 MG/DL

## 2020-09-20 PROCEDURE — 6360000002 HC RX W HCPCS: Performed by: PSYCHIATRY & NEUROLOGY

## 2020-09-20 PROCEDURE — 80061 LIPID PANEL: CPT

## 2020-09-20 PROCEDURE — 83036 HEMOGLOBIN GLYCOSYLATED A1C: CPT

## 2020-09-20 PROCEDURE — 6370000000 HC RX 637 (ALT 250 FOR IP): Performed by: PSYCHIATRY & NEUROLOGY

## 2020-09-20 PROCEDURE — 36415 COLL VENOUS BLD VENIPUNCTURE: CPT

## 2020-09-20 PROCEDURE — 80053 COMPREHEN METABOLIC PANEL: CPT

## 2020-09-20 PROCEDURE — 1240000000 HC EMOTIONAL WELLNESS R&B

## 2020-09-20 RX ADMIN — METHYLPREDNISOLONE 4 MG: 4 TABLET ORAL at 08:24

## 2020-09-20 RX ADMIN — ESCITALOPRAM OXALATE 10 MG: 10 TABLET ORAL at 08:24
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
## 2020-09-20 NOTE — PROGRESS NOTES
BP better  Liver enzymes trended down. He should have repeat as OP by his PCP to ensure normalization.   Instructions for follow up have been placed in his dc paper work    Bob Hernandez PA-C  9/20/2020 8:30 AM

## 2020-09-20 NOTE — PLAN OF CARE
Problem: Depressive Behavior With or Without Suicide Precautions:  Goal: Able to verbalize and/or display a decrease in depressive symptoms  Description: Able to verbalize and/or display a decrease in depressive symptoms  Outcome: Ongoing   Sergio Leo isolated to his room all evening. He was encouraged to come to the evening groups but declined. Snack was taken to him and he initially did not eat but was encouraged to eat when he took his medication, which was delivered to the room. Compliant with hs medication. Polite, presents with depressed mood and flat affect. Answers questions readily. Denies that he was ever suicidal.  Denies suicidal ideation at this time. Denies homicidal ideation and hallucinations also.
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
## 2020-09-21 LAB
ESTIMATED AVERAGE GLUCOSE: 108.3 MG/DL
HBA1C MFR BLD: 5.4 %

## 2020-09-21 PROCEDURE — 97535 SELF CARE MNGMENT TRAINING: CPT

## 2020-09-21 PROCEDURE — 1240000000 HC EMOTIONAL WELLNESS R&B

## 2020-09-21 PROCEDURE — 6370000000 HC RX 637 (ALT 250 FOR IP): Performed by: PSYCHIATRY & NEUROLOGY

## 2020-09-21 PROCEDURE — 99233 SBSQ HOSP IP/OBS HIGH 50: CPT | Performed by: PSYCHIATRY & NEUROLOGY

## 2020-09-21 PROCEDURE — 97165 OT EVAL LOW COMPLEX 30 MIN: CPT

## 2020-09-21 RX ADMIN — ESCITALOPRAM OXALATE 10 MG: 10 TABLET ORAL at 08:50

## 2020-09-21 NOTE — PLAN OF CARE
Problem: Depressive Behavior With or Without Suicide Precautions:  Goal: Able to verbalize and/or display a decrease in depressive symptoms  Description: Able to verbalize and/or display a decrease in depressive symptoms  9/20/2020 2339 by Janeth Khan LPN  Outcome: Ongoing   Joshua Pillo has been isolative to his room this shift. Patient denies current SI/HI, denies A/V/H and reports his mood is \"good. \" Patient declined evening group. Presents flat and evasive. Encouraged groups and participation in the milieu. Will monitor.

## 2020-09-21 NOTE — PROGRESS NOTES
- 36.0 g/dL Final    RDW 09/18/2020 13.4  12.4 - 15.4 % Final    Platelets 30/33/5215 342  135 - 450 K/uL Final    MPV 09/18/2020 7.7  5.0 - 10.5 fL Final    Neutrophils % 09/18/2020 71.0  % Final    Lymphocytes % 09/18/2020 18.8  % Final    Monocytes % 09/18/2020 6.3  % Final    Eosinophils % 09/18/2020 2.9  % Final    Basophils % 09/18/2020 1.0  % Final    Neutrophils Absolute 09/18/2020 13.1* 1.7 - 7.7 K/uL Final    Lymphocytes Absolute 09/18/2020 3.5  1.0 - 5.1 K/uL Final    Monocytes Absolute 09/18/2020 1.2  0.0 - 1.3 K/uL Final    Eosinophils Absolute 09/18/2020 0.5  0.0 - 0.6 K/uL Final    Basophils Absolute 09/18/2020 0.2  0.0 - 0.2 K/uL Final    Sodium 09/18/2020 134* 136 - 145 mmol/L Final    Potassium 09/18/2020 3.4* 3.5 - 5.1 mmol/L Final    Chloride 09/18/2020 98* 99 - 110 mmol/L Final    CO2 09/18/2020 20* 21 - 32 mmol/L Final    Anion Gap 09/18/2020 16  3 - 16 Final    Glucose 09/18/2020 110* 70 - 99 mg/dL Final    BUN 09/18/2020 8  7 - 20 mg/dL Final    CREATININE 09/18/2020 0.9  0.9 - 1.3 mg/dL Final    GFR Non- 09/18/2020 >60  >60 Final    Comment: >60 mL/min/1.73m2 EGFR, calc. for ages 25 and older using the  MDRD formula (not corrected for weight), is valid for stable  renal function.  GFR  09/18/2020 >60  >60 Final    Comment: Chronic Kidney Disease: less than 60 ml/min/1.73 sq.m. Kidney Failure: less than 15 ml/min/1.73 sq.m. Results valid for patients 18 years and older.       Calcium 09/18/2020 9.1  8.3 - 10.6 mg/dL Final    Total Protein 09/18/2020 8.7* 6.4 - 8.2 g/dL Final    Alb 09/18/2020 5.4* 3.4 - 5.0 g/dL Final    Albumin/Globulin Ratio 09/18/2020 1.6  1.1 - 2.2 Final    Total Bilirubin 09/18/2020 0.4  0.0 - 1.0 mg/dL Final    Alkaline Phosphatase 09/18/2020 92  40 - 129 U/L Final    ALT 09/18/2020 91* 10 - 40 U/L Final    AST 09/18/2020 44* 15 - 37 U/L Final    Globulin 09/18/2020 3.3  g/dL Final    Ethanol Lvl 09/18/2020 185  mg/dL Final    Comment:    None Detected  Conversion factor:  100 mg/dl = .100 g/dl  For Medical Purposes Only      Acetaminophen Level 09/18/2020 <5* 10 - 30 ug/mL Final    Comment: Therapeutic Range: 10.0-30.0 ug/mL  Toxic: >=150 ug/mL      Amphetamine Screen, Urine 09/18/2020 Neg  Negative <1000ng/mL Final    Barbiturate Screen, Ur 09/18/2020 Neg  Negative <200 ng/mL Final    Benzodiazepine Screen, Urine 09/18/2020 Neg  Negative <200 ng/mL Final    Cannabinoid Scrn, Ur 09/18/2020 Neg  Negative <50 ng/mL Final    Cocaine Metabolite Screen, Urine 09/18/2020 Neg  Negative <300 ng/mL Final    Opiate Scrn, Ur 09/18/2020 Neg  Negative <300 ng/mL Final    Comment: \"Therapeutic levels of pain medication, especially oxycontin and synthetic  opioids, may not be detected by this Methodology. Pain management screen  panel  Drug panel-PM-Hi Res Ur, Interp (PAIN) should be considered for drug  monitoring \".  PCP Screen, Urine 09/18/2020 Neg  Negative <25 ng/mL Final    Methadone Screen, Urine 09/18/2020 Neg  Negative <300 ng/mL Final    Propoxyphene Scrn, Ur 09/18/2020 Neg  Negative <300 ng/mL Final    Oxycodone Urine 09/18/2020 Neg  Negative <100 ng/ml Final    pH, UA 09/18/2020 5.0   Final    Comment: Urine pH less than 5.0 or greater than 8.0 may indicate sample adulteration. Another sample should be collected if clinically  indicated.  Drug Screen Comment: 09/18/2020 see below   Final    Comment: This method is a screening test to detect only these drug  classes as part of a medical workup. Confirmatory testing  by another method should be ordered if clinically indicated.       Salicylate, Serum 87/67/2383 <0.3* 15.0 - 30.0 mg/dL Final    Comment: Therapeutic Range: 15.0-30.0 mg/dL  Toxic: >30.0 mg/dL      Ethanol Lvl 09/19/2020 65  mg/dL Final    Comment:    None Detected  Conversion factor:  100 mg/dl = .100 g/dl  For Medical Purposes Only      SARS-CoV-2, NAAT 09/19/2020 Not Detected  Not Detected Final    Comment: Rapid NAAT:   Negative results should be treated as presumptive and,  if inconsistent with clinical signs and symptoms or necessary for  patient management, should be tested with an alternative molecular  assay. Negative results do not preclude SARS-CoV-2 infection and  should not be used as the sole basis for patient management decisions. This test has been authorized by the FDA under an Emergency Use  Authorization (EUA) for use by authorized laboratories. Fact sheet for Healthcare Providers:  BuildHer.es  Fact sheet for Patients: AG&P.alison    METHODOLOGY: Isothermal Nucleic Acid Amplification      TSH 09/19/2020 2.20  0.27 - 4.20 uIU/mL Final    Hemoglobin A1C 09/20/2020 5.4  See comment % Final    Comment: Comment:  Diagnosis of Diabetes: > or = 6.5%  Increased risk of diabetes (Prediabetes): 5.7-6.4%  Glycemic Control: Nonpregnant Adults: <7.0%                    Pregnant: <6.0%        eAG 09/20/2020 108.3  mg/dL Final    Cholesterol, Total 09/20/2020 191  0 - 199 mg/dL Final    Triglycerides 09/20/2020 205* 0 - 150 mg/dL Final    HDL 09/20/2020 40  40 - 60 mg/dL Final    LDL Calculated 09/20/2020 110* <100 mg/dL Final    VLDL Cholesterol Calculated 09/20/2020 41  Not Established mg/dL Final    Sodium 09/20/2020 135* 136 - 145 mmol/L Final    Potassium reflex Magnesium 09/20/2020 4.2  3.5 - 5.1 mmol/L Final    Chloride 09/20/2020 100  99 - 110 mmol/L Final    CO2 09/20/2020 28  21 - 32 mmol/L Final    Anion Gap 09/20/2020 7  3 - 16 Final    Glucose 09/20/2020 97  70 - 99 mg/dL Final    BUN 09/20/2020 12  7 - 20 mg/dL Final    CREATININE 09/20/2020 0.9  0.9 - 1.3 mg/dL Final    GFR Non- 09/20/2020 >60  >60 Final    Comment: >60 mL/min/1.73m2 EGFR, calc. for ages 25 and older using the  MDRD formula (not corrected for weight), is valid for stable  renal function.       GFR  09/20/2020 >60  >60 Final    Comment: Chronic Kidney Disease: less than 60 ml/min/1.73 sq.m. Kidney Failure: less than 15 ml/min/1.73 sq.m. Results valid for patients 18 years and older.  Calcium 09/20/2020 9.1  8.3 - 10.6 mg/dL Final    Total Protein 09/20/2020 7.4  6.4 - 8.2 g/dL Final    Alb 09/20/2020 4.6  3.4 - 5.0 g/dL Final    Albumin/Globulin Ratio 09/20/2020 1.6  1.1 - 2.2 Final    Total Bilirubin 09/20/2020 0.6  0.0 - 1.0 mg/dL Final    Alkaline Phosphatase 09/20/2020 81  40 - 129 U/L Final    ALT 09/20/2020 72* 10 - 40 U/L Final    AST 09/20/2020 29  15 - 37 U/L Final    Globulin 09/20/2020 2.8  g/dL Final            Medications  Current Facility-Administered Medications: acetaminophen (TYLENOL) tablet 650 mg, 650 mg, Oral, Q4H PRN  ibuprofen (ADVIL;MOTRIN) tablet 400 mg, 400 mg, Oral, Q6H PRN  sterile water injection 2.1 mL, 2.1 mL, Intramuscular, Q4H PRN  traZODone (DESYREL) tablet 50 mg, 50 mg, Oral, Nightly PRN  benztropine mesylate (COGENTIN) injection 2 mg, 2 mg, Intramuscular, BID PRN  magnesium hydroxide (MILK OF MAGNESIA) 400 MG/5ML suspension 30 mL, 30 mL, Oral, Daily PRN  aluminum & magnesium hydroxide-simethicone (MAALOX) 200-200-20 MG/5ML suspension 30 mL, 30 mL, Oral, Q6H PRN  escitalopram (LEXAPRO) tablet 10 mg, 10 mg, Oral, Daily    ASSESSMENT AND PLAN    Principal Problem:    Severe episode of recurrent major depressive disorder, without psychotic features ([address]  Active Problems:    Depression    Leucocytosis    Poison ivy dermatitis    Hypokalemia    Routine general medical examination at a health care facility    Elevated blood-pressure reading without diagnosis of hypertension    Transaminitis    Suicidal ideation  Resolved Problems:    * No resolved hospital problems. *       1. Patient's symptoms are improving  2. Probable discharge is in 1-2 days. 3.Discharge planning is incomplete  4. Suicidal ideation is better  5.  Total time with patient was 40 minutes and more than 50 % of that time was spent counseling the patient on their symptoms, treatment and expected goals. Addendum to PA student note:  Pt seen, examined, and evaluated with PA student, Elmira Garcia acted as my scribe for the above documentation.  I have reviewed the current history, physical findings, labs, assessment and plan; and agree with note as documented.  Andry Kwong MD  Physician Psychiatry

## 2020-09-21 NOTE — PROGRESS NOTES
Inpatient Occupational Therapy  Evaluation and Treatment    Unit:  [facility]  Date:  9/21/2020  Patient Name:    Jp Christopher  Admitting diagnosis:  Depression, unspecified depression type [F32.9]  Admit Date:  9/18/2020  Precautions/Restrictions/WB Status/ Lines/ Wounds/ Oxygen:  [facility] precautions, suicide precautions  Treatment Time:  0646 -0  Treatment Number:  1  History: per H&P 37 y.o. male with a history of depression who was brought in by the police yesterday after his ex-wife called due to him shooting a firearm last night while intoxicated with wine. Per patient, the firearm was not directed towards anyone and he was just shooting out in the field. He denies intent to kill himself or his family members. He says that he and his wife are going through a divorce. He drank a large bottle of wine quickly last night and started on his second bottle when he was arrested. His firearms were confiscated. He felt his mood definitely worsened after drinking. He drinks about a bottle of wine twice a week to \"let loose. \" He was seeing his family provider who prescribed Zoloft which he discontinued about 2 months ago due to feeling lethargic and experiencing sexual dysfunction. He has tried Wellbutrin in the past which did not help. 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 Currently, he endorses feeling sad, feeling guilty with everything going on between him and his wife and putting his kids through this, feeling helpless, lower energy, decreased concentration, decreased appetite, and experiencing passive suicidal ideation. He denies anhedonia, plan or intent to kill himself, or HI. Per ED notes, his wife and friend had reported that he made a comment about ending his own life.  When he and his wife were first going through the divorce, he tried living with his parents for a month, however, they were not very supportive and trying to tell him how he should do things. He decided to move in a camper on he and his ex-wife's property so that he could be closer to his kids and away from his parents. He denies any illicit drug use. Patient Goals for Therapy:  \" more coping strategies \"      Discharge Recommendations:   []Home Independently  [x] Home with assist PRN [] Home OT  []SNF  []ARU    DME needs for discharge:   none     AM-PAC Score: 24     Home Health S4 Level: [x] NA   [] Level 1- Standard  []  Level 2- Social  [] Level 3- Safety  []  Level 4- Sick    ACLS: to be assessed    Preadmission Environment:    Pt. Lives   [] alone  [x]with family wife 5, 7, 4  Home environment:  Staying in a camper ,Apartment   [x]one story  []two story home. Preadmission Status / PLOF:  Pt. Able to drive   [x]Yes  []No   Pt Fully independent for ADLs/IADLs. [x]Yes  []No    Pt. Fully independent for transfers and gait and walked with: [x]No Device  []Walker   []Cane    Sleep Hygiene:  4-5 hrs, fragmented,   Income: weld repair aircraft planes, full time   Financial Management:  Able to manage own finances, wife helps as well  Leisure Interests:  Fishing, riding motorcycles, playing kids- on the farm, shoot firearms  Medication Management: yes   Health Management: yes PCP, psychiatrist-no therapist- no  Social Network:   Wife, friend,   Stressors:  1) not missing work 2) changes in relationship with wife -civil for children   Coping Skills:  Video games-distraction, playing with kids,     Pain  []Yes  [x]No  Rating:  Location:  Pain Medicine Status: [x] Denies need  [] Pain med requested  [] RN notified. Cognition    A&Ox4, patient agreeable to therapy noted to rub hands on legs and touch arms frequently during conversation presenting as restless and unsettled. Pt appeared calmer after performing breathing techniques. Follows []1 step and [x]2 step commands.       Upper Extremity ROM:    WFL, pt able to perform all bed mobility, transfers, and gait without ROM limitation. Upper Extremity Strength:    WFL, pt able to perform all bed mobility, transfers, and gait without strength limitation. Upper Extremity Sensation:    No apparent deficits. Upper Extremity Proprioception:    No apparent deficits. Skin Integrity:    Intact    Coordination and Tone:     WFLs    Balance  Static Sitting:  [x] Good [] Fair [] Poor  Dynamic Sitting:  [x] Good [] Fair [] Poor  Static Standing: [x] Good [] Fair [] Poor  Dynamic Standing: [x] Good [] Fair [] Poor    Bed mobility:  independent  Supine to sit:  Sit to supine:  Scooting to head of bed:  Scooting in sitting:  Rolling:  Bridging:    Transfers:  independent  Sit to stand:  Stand to sit:  Bed/Chair to/from toilet:    Self Care:  independent  Toileting:  Grooming:  Dressing:    Exercise / Activities Initiated:   Pt. Educated on role of OT. Pt. Participated in:  Eval, sleep management, medication management, and coping strategies- demonstrated deep breathing technique. Assessment of Deficits:   Pt demonstrated decreased activity tolerance, decreased safety awareness, and decreased ADL/IADL status. Pt. Limited during evaluation by cognition. At end of evaluation, pt. In room    Goal(s) : To be met in 3 Visits:  1). Pt. To complete ACLS. 2). Pt. To verbalize understanding of sleep hygiene education. Goal initiated 9/21     To be met in 5 Visits:  1). Pt. To complete 1 SMART long term goal and 2 SMART short term goals with mod assist.  2). Pt. To verbalize 3 new coping skills. goal initiated 9/21  3). Pt. To complete interest check list.    4). Pt. To verbalize understanding of 3 communication styles. 5). Pt. To complete wellness plan. 6). Pt. To complete a daily schedule of healthy activities/routines with mod assist.   7). Pt. To identify 2 memory strategies to take medications as prescribed. Rehabilitation Potential:  Good for goals listed above.     Strengths for achieving goals include:  PLOF  Barriers to achieving goals include:  Decreased cognition     Plan: To be seen 2-5x/week while in acute care setting for therapeutic exercises/act, ADL retraining, NMR and patient/caregiver ed/instruction.      Timed Code Treatment Minutes:    19 minutes    Total Treatment Time:   29   minutes    Signature and License Number  Ana M Yi, OTR/L [id number]          If patient discharges from this facility prior to next visit, this note will serve as the Discharge Summary

## 2020-09-21 NOTE — PLAN OF CARE
Problem: Depressive Behavior With or Without Suicide Precautions:  Goal: Able to verbalize and/or display a decrease in depressive symptoms  Description: Able to verbalize and/or display a decrease in depressive symptoms  9/21/2020 1139 by Vickie Mejia LPN  Outcome: Ongoing     Problem: Depressive Behavior With or Without Suicide Precautions:  Goal: Ability to disclose and discuss suicidal ideas will improve  Description: Ability to disclose and discuss suicidal ideas will improve  Outcome: Ongoing   Fabrice Schwarz has been visible but withdrawn to self. Patient has not attended groups yet this shift. Patient currently denies SI and HI. He has been medication compliant and feels anxious and concerned with notifying his employer he would not be at work due to being in the hospital. Patient denies any needs at this time. Will continue to monitor for safety.

## 2020-09-21 NOTE — H&P
Not on file     Attends meetings of clubs or organizations: Not on file     Relationship status: Not on file    Intimate partner violence     Fear of current or ex partner: Not on file     Emotionally abused: Not on file     Physically abused: Not on file     Forced sexual activity: Not on file   Other Topics Concern    Not on file   Social History Narrative    Not on file       OBJECTIVE:   Vitals:    09/20/20 2020   BP: (!) 134/93   Pulse: 65   Resp: 16   Temp: 98.6 °F (37 °C)   SpO2: 97%       REVIEW OF SYSTEMS:   Reports no current cardiovascular, respiratory, gastrointestinal, genitourinary,   integumentary, neurological, musculoskeletal, or immunological symptoms today. PSYCHIATRIC:  See HPI above     PSYCHIATRIC EXAMINATION / MENTAL STATUS EXAM:     CONSTITUTIONAL:    Vitals:    Blood pressure (!) 134/93, pulse 65, temperature 98.6 °F (37 °C), temperature source Temporal, resp. rate 16, height 5' 9\" (1.753 m), weight 250 lb (113.4 kg), SpO2 97 %.   General appearance:  [x]  appears age, []  appears older than stated age,     [x]  adequately dressed and groomed, [] disheveled,                [x]  in no acute distress, [] appears mildly distressed,      []  Other:      MUSCULOSKELETAL:   Gait:   [x] normal, [] antalgic, [] unsteady, [] in bed, gait not evaluated   Station:  [] erect, [x] sitting, [] recumbent, [] other        Strength/tone:  [x] muscle strength and tone appear consistent with age and condition     [] atrophy      [] abnormal movements  PSYCHIATRIC:    Relatedness:  [x] cooperative, [] guarded, [] indifferent, [] hostile,      [] sedated  Speech:  [x] normal prosody, [] pressured, [] decreased volume,    [] slurred, [] halting, [] slowed, [] other     [] echolalia, [] incoherent, [] stuttering   Eye contact:  [x] direct, [] avoidant, [] intense  Kinetics:  [x] normal, [] increased, [] decreased  Mood:   [] stable, [x] depressed, [] anxious, [] irritable,     [] labile  Affect:   [] normal range, [] constricted, [x] depressed, [x] anxious,     [] angry, [] blunted  Hallucinations  [x] denies, [] auditory,  [] visual,  [] olfactory, [] tactile  Delusions  [x] none, [] grandiose,  [] jealous,  [] persecutory,  [] somatic,     [] bizarre  Preoccupations  [x] none, [] violence, [] obsessions, [] other     Suicidal ideation  [] denies, [x] endorses  Homicidal ideation [] denies, [] endorses  Thought process: [x] logical, [] circumstantial, [] tangential, [] CAMILA,     [] simplistic, [] disorganized  Associations:  [x] logical and coherent, [] loosening, [] disorganized  Insight:   [] good, [x] fair, [] poor  Judgment:  [] good, [x] fair, [] poor  Attention and concentration:     [x] intact, [] limited, [] able to focus on interview,     [] grossly impaired  Orientation:  [x] person, place, time, situation     [] disoriented to:     Memory:  Remote memory [x] intact, [] impaired     Recent memory  [x] intact, [] impaired          IMPRESSION    Principal Problem:    Severe episode of recurrent major depressive disorder, without psychotic features ([facility] [address]  Active Problems:    Depression    Leucocytosis    Poison ivy dermatitis    Hypokalemia    Routine general medical examination at a health care facility    Elevated blood-pressure reading without diagnosis of hypertension    Transaminitis    Suicidal ideation  Resolved Problems:    * No resolved hospital problems.  *       ______  Dx: axis I: Severe episode of recurrent major depressive disorder, without psychotic features ([address]   MARIAMA  Axis 2: None  Accord 3: See Medical History  Patient Active Problem List    Diagnosis Date Noted    Depression 09/19/2020    Leucocytosis 09/19/2020    Poison ivy dermatitis 09/19/2020    Hypokalemia 09/19/2020    Routine general medical examination at a health care facility 09/19/2020    Elevated blood-pressure reading without diagnosis of hypertension 09/19/2020    Severe episode of recurrent major depressive disorder, without psychotic features ([facility] 09/19/2020    Transaminitis     Suicidal ideation     And Present on Admission:   Depression   Leucocytosis   Poison ivy dermatitis   Hypokalemia   Routine general medical examination at a health care facility   Elevated blood-pressure reading without diagnosis of hypertension   Transaminitis   Severe episode of recurrent major depressive disorder, without psychotic features ([facility]   Suicidal ideation    Axis 4: Other psychosocial and environmental problems    Axis 5: 11-20 some danger of hurting self or others possible OR occasionally fails to maintain minimal personal hygiene OR gross impairment in communication   All conditions on Axis 1 and Axis 2 and active Axis 3 problems are being treated while patient is hospitalized. Active Hospital Problems    Diagnosis Date Noted    Depression [F32.9] 09/19/2020    Leucocytosis [D72.829] 09/19/2020    Poison ivy dermatitis [L23.7] 09/19/2020    Hypokalemia [E87.6] 09/19/2020    Routine general medical examination at a health care facility [Z00.00] 09/19/2020    Elevated blood-pressure reading without diagnosis of hypertension [R03.0] 09/19/2020    Severe episode of recurrent major depressive disorder, without psychotic features ([facility] [F33.2] 09/19/2020    Transaminitis [R74.0]     Suicidal ideation [R45.851]      Tx plan:  Plan is to prevent self injury, stabilize affect, restore sleep, treat depression, treat anxiety, establish/maintain aftercare, increase coping mechanisms, improve medication compliance. All conditions present on admission are being treated while pt is hospitalized. Discussed PHP after discharge as part of transition back to the community.      Medications  Current Facility-Administered Medications   Medication Dose Route Frequency Provider Last Rate Last Dose    acetaminophen (TYLENOL) tablet 650 mg  650 mg Oral Q4H PRN Maximiliano Burk MD        ibuprofen (ADVIL;MOTRIN) tablet 400 mg  400 mg Oral Q6H PRN Michele Wayne MD        sterile water injection 2.1 mL  2.1 mL Intramuscular Q4H PRN Michele Wayne MD        traZODone (DESYREL) tablet 50 mg  50 mg Oral Nightly PRN Michele Wayne MD        benztropine mesylate (COGENTIN) injection 2 mg  2 mg Intramuscular BID PRN Michele Wayne MD        magnesium hydroxide (MILK OF MAGNESIA) 400 MG/5ML suspension 30 mL  30 mL Oral Daily PRN Michele Wayne MD        aluminum & magnesium hydroxide-simethicone (MAALOX) 200-200-20 MG/5ML suspension 30 mL  30 mL Oral Q6H PRN Michele Wayne MD        escitalopram (LEXAPRO) tablet 10 mg  10 mg Oral Daily Michele Wayne MD   10 mg at 09/20/20 3902      escitalopram  10 mg Oral Daily      PRN Meds: acetaminophen, ibuprofen, sterile water, traZODone, benztropine mesylate, magnesium hydroxide, aluminum & magnesium hydroxide-simethicone   Estimated length of stay: 2-3 days  Prognosis:  fair  Criteria for Discharge: Not suicidal, sleeping well, affect stable, depression improving, eating well, aftercare arranged. Spent > 70 minutes evaluating and treating patient. More than 50% of the time was spent discussing treatment and care plan.     ______  PLAN   1. Admit to Adult Behavioral Unit / Senior Behavioral Unit  2. Consult Internal Medicine to evaluate and treat medical conditions  3. Add Lexapro 10 mg Qd for depression  4. Occupational Therapy, Physical Therapy, Group Psychotherapy as tolerated   5. Reviewed treatment plan with patient including medication risks, benefits, side effects. Obtained informed consent for treatment. Addendum to PA student note:  Pt seen, examined, and evaluated with PA student, Rubén Harley , who acted as my scribe for the above documentation. I have reviewed the current history, physical findings, labs, assessment and plan; and agree with note as documented.     Michele Wayne MD  Physician Psychiatry

## 2020-09-22 PROCEDURE — 1240000000 HC EMOTIONAL WELLNESS R&B

## 2020-09-22 PROCEDURE — 99233 SBSQ HOSP IP/OBS HIGH 50: CPT | Performed by: PSYCHIATRY & NEUROLOGY

## 2020-09-22 PROCEDURE — 6370000000 HC RX 637 (ALT 250 FOR IP): Performed by: PSYCHIATRY & NEUROLOGY

## 2020-09-22 PROCEDURE — 97535 SELF CARE MNGMENT TRAINING: CPT

## 2020-09-22 RX ADMIN — ESCITALOPRAM OXALATE 10 MG: 10 TABLET ORAL at 09:22

## 2020-09-22 NOTE — GROUP NOTE
Group Therapy Note    Music therapy group consisted of discussion of coping (distracting vs releasing) and blues songwriting. Patients engaged in blues songwriting discussing stress and coping skills addressing depresion. Group members collaborated to write one song, concluded group with an improvised \"blues riff\" on accepting emotions and setting action steps for addressing negative emotions. Attendees: 9         Patient's Goal:  Active engagement in reflective songwriting intervention, attentiveness to peers in group processing, improvisational interventions. Notes:  Patient present and actively engaged across experiential stimuli, providing multiple lyrics in group songwriting interventions, collaborating with peers to complete group-written song. Status After Intervention:  Improved    Participation Level:  Active Listener    Participation Quality: Appropriate, Attentive and Sharing      Speech:  normal      Thought Process/Content: Logical      Affective Functioning: Congruent      Mood: Positive and Relaxed      Level of consciousness:  Alert and Attentive      Response to Learning: Able to verbalize/acknowledge new learning, Capable of insight and Progressing to goal      Endings: None Reported    Modes of Intervention: Socialization, Exploration, Activity and Media      Discipline Responsible: Psychoeducational Specialist      Signature:  Kunal Acosta MM, MT-BC

## 2020-09-22 NOTE — GROUP NOTE
Group Therapy Note    Date: 9/22/2020    Group Start Time: 1110  Group End Time: 9208  Group Topic: Psychotherapy    [address]    Group Therapy Note    Attendees: 8    Patient shared and set a goal at the beginning of group to practice a new way of being in group today. Notes:  Pt was engaged in group and set goal to work himself and accepting himself. Pt was talkative and engaged in the group discussion; pt appeared to be supportive in offering feedback to peers. Status After Intervention:  Improved    Participation Level:  Active Listener and Interactive    Participation Quality: Appropriate and Sharing      Speech:  normal      Thought Process/Content: Linear      Affective Functioning: Constricted/Restricted      Mood: anxious and depressed      Level of consciousness:  Alert and Oriented x4      Response to Learning: Able to verbalize current knowledge/experience, Able to verbalize/acknowledge new learning and Able to retain information      Endings: None Reported    Modes of Intervention: Education, Support, Socialization, Exploration, Clarifying and Problem-solving      Discipline Responsible: /Counselor      Signature:  Koby Jessica, LPCC-S, MELODIE

## 2020-09-22 NOTE — PLAN OF CARE
Problem: Depressive Behavior With or Without Suicide Precautions:  Goal: Able to verbalize and/or display a decrease in depressive symptoms  Description: Able to verbalize and/or display a decrease in depressive symptoms  Outcome: Ongoing     Problem: Depressive Behavior With or Without Suicide Precautions:  Goal: Ability to disclose and discuss suicidal ideas will improve  Description: Ability to disclose and discuss suicidal ideas will improve  Outcome: Ongoing   Julienneberg has been more visible and social today. Patient has attended groups and been medication compliant. Patient currently denies SI/HI/AVH. Patient expresses he is feeling better and would like to engage and learn more from groups. Will continue to monitor for safety.

## 2020-09-22 NOTE — PROGRESS NOTES
Inpatient Occupational Therapy  Evaluation and Treatment    Unit:  [facility]  Date:  9/22/2020  Patient Name:    Gorge Berg  Admitting diagnosis:  Depression, unspecified depression type [F32.9]  Admit Date:  9/18/2020  Precautions/Restrictions/WB Status/ Lines/ Wounds/ Oxygen:  [facility] precautions, suicide precautions  Treatment Time:  14:21-15:09  Treatment Number:  2  History: per H&P 37 y.o. male with a history of depression who was brought in by the police yesterday after his ex-wife called due to him shooting a firearm last night while intoxicated with wine. Per patient, the firearm was not directed towards anyone and he was just shooting out in the field. He denies intent to kill himself or his family members. He says that he and his wife are going through a divorce. He drank a large bottle of wine quickly last night and started on his second bottle when he was arrested. His firearms were confiscated. He felt his mood definitely worsened after drinking. He drinks about a bottle of wine twice a week to \"let loose. \" He was seeing his family provider who prescribed Zoloft which he discontinued about 2 months ago due to feeling lethargic and experiencing sexual dysfunction. He has tried Wellbutrin in the past which did not help. 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 Currently, he endorses feeling sad, feeling guilty with everything going on between him and his wife and putting his kids through this, feeling helpless, lower energy, decreased concentration, decreased appetite, and experiencing passive suicidal ideation. He denies anhedonia, plan or intent to kill himself, or HI. Per ED notes, his wife and friend had reported that he made a comment about ending his own life.  When he and his wife were first going through the divorce, he tried living with his parents for a month, however, they were not very supportive and trying to tell him how he should do activities/routines with mod assist.   7). Pt. To identify 2 memory strategies to take medications as prescribed. Plan:  Continue OT per POC.      Timed Code Treatment Minutes:  48   minutes    Total Treatment Time:   48   minutes    Signature and License Number    Kayla Leo OTR/L  #[id number]            If patient discharges from this facility prior to next visit, this note will serve as the Discharge Summary

## 2020-09-22 NOTE — PROGRESS NOTES
Department of Psychiatry  Physician Assistant Student Progress Note  Chief Complaint: Salas Franco to give groups a try and see what I learn\"  Patient's chart was reviewed and collaborated with  about the treatment plan. SUBJECTIVE:    Patient is feeling better. He has spoken to friends and family who are supportive of him and glad he is getting help. He has been seen walking around the unit now whereas before he was isolating himself in his room. He plans on attending groups today and is looking forward to gaining something from his stay here that he can use. He feels like he is one step closer to reaching his goal of getting back on his feet and seeing his kids. Suicidal ideation:  denies suicidal ideation. Patient does not have medication side effects. ROS: Patient has new complaints: no  Sleeping adequately:  Yes   Appetite adequate: Yes  Attending groups: Yes  Visitors:No    OBJECTIVE    Physical  VITALS:  /75   Pulse 84   Temp 98 °F (36.7 °C) (Temporal)   Resp 16   Ht 5' 9\" (1.753 m)   Wt 250 lb (113.4 kg)   SpO2 97%   BMI 36.92 kg/m²     Mental Status Examination:  Patients appearance was well-appearing. Thoughts are Goal directed, Holt and Logical. Homicidal ideations none. No abnormal movements, tics or mannerisms. Memory intact Aims 0. Concentration Good. Alert and oriented X 4. Insight and Judgement normal insight and judgment. Patient was cooperative.  Patient gait normal. Mood within normal limits, affect normal affect Hallucinations Absent, suicidal ideations no specific plan to harm self Speech normal pitch and normal volume  Data  Labs:   Admission on 09/18/2020   Component Date Value Ref Range Status    WBC 09/18/2020 18.4* 4.0 - 11.0 K/uL Final    RBC 09/18/2020 5.03  4.20 - 5.90 M/uL Final    Hemoglobin 09/18/2020 15.1  13.5 - 17.5 g/dL Final    Hematocrit 09/18/2020 45.1  40.5 - 52.5 % Final    MCV 09/18/2020 89.6  80.0 - 100.0 fL Final    MCH 09/18/2020 30.0 26.0 - 34.0 pg Final    MCHC 09/18/2020 33.5  31.0 - 36.0 g/dL Final    RDW 09/18/2020 13.4  12.4 - 15.4 % Final    Platelets 75/88/1235 342  135 - 450 K/uL Final    MPV 09/18/2020 7.7  5.0 - 10.5 fL Final    Neutrophils % 09/18/2020 71.0  % Final    Lymphocytes % 09/18/2020 18.8  % Final    Monocytes % 09/18/2020 6.3  % Final    Eosinophils % 09/18/2020 2.9  % Final    Basophils % 09/18/2020 1.0  % Final    Neutrophils Absolute 09/18/2020 13.1* 1.7 - 7.7 K/uL Final    Lymphocytes Absolute 09/18/2020 3.5  1.0 - 5.1 K/uL Final    Monocytes Absolute 09/18/2020 1.2  0.0 - 1.3 K/uL Final    Eosinophils Absolute 09/18/2020 0.5  0.0 - 0.6 K/uL Final    Basophils Absolute 09/18/2020 0.2  0.0 - 0.2 K/uL Final    Sodium 09/18/2020 134* 136 - 145 mmol/L Final    Potassium 09/18/2020 3.4* 3.5 - 5.1 mmol/L Final    Chloride 09/18/2020 98* 99 - 110 mmol/L Final    CO2 09/18/2020 20* 21 - 32 mmol/L Final    Anion Gap 09/18/2020 16  3 - 16 Final    Glucose 09/18/2020 110* 70 - 99 mg/dL Final    BUN 09/18/2020 8  7 - 20 mg/dL Final    CREATININE 09/18/2020 0.9  0.9 - 1.3 mg/dL Final    GFR Non- 09/18/2020 >60  >60 Final    Comment: >60 mL/min/1.73m2 EGFR, calc. for ages 25 and older using the  MDRD formula (not corrected for weight), is valid for stable  renal function.  GFR  09/18/2020 >60  >60 Final    Comment: Chronic Kidney Disease: less than 60 ml/min/1.73 sq.m. Kidney Failure: less than 15 ml/min/1.73 sq.m. Results valid for patients 18 years and older.       Calcium 09/18/2020 9.1  8.3 - 10.6 mg/dL Final    Total Protein 09/18/2020 8.7* 6.4 - 8.2 g/dL Final    Alb 09/18/2020 5.4* 3.4 - 5.0 g/dL Final    Albumin/Globulin Ratio 09/18/2020 1.6  1.1 - 2.2 Final    Total Bilirubin 09/18/2020 0.4  0.0 - 1.0 mg/dL Final    Alkaline Phosphatase 09/18/2020 92  40 - 129 U/L Final    ALT 09/18/2020 91* 10 - 40 U/L Final    AST 09/18/2020 44* 15 - 37 U/L Final  Globulin 09/18/2020 3.3  g/dL Final    Ethanol Lvl 09/18/2020 185  mg/dL Final    Comment:    None Detected  Conversion factor:  100 mg/dl = .100 g/dl  For Medical Purposes Only      Acetaminophen Level 09/18/2020 <5* 10 - 30 ug/mL Final    Comment: Therapeutic Range: 10.0-30.0 ug/mL  Toxic: >=150 ug/mL      Amphetamine Screen, Urine 09/18/2020 Neg  Negative <1000ng/mL Final    Barbiturate Screen, Ur 09/18/2020 Neg  Negative <200 ng/mL Final    Benzodiazepine Screen, Urine 09/18/2020 Neg  Negative <200 ng/mL Final    Cannabinoid Scrn, Ur 09/18/2020 Neg  Negative <50 ng/mL Final    Cocaine Metabolite Screen, Urine 09/18/2020 Neg  Negative <300 ng/mL Final    Opiate Scrn, Ur 09/18/2020 Neg  Negative <300 ng/mL Final    Comment: \"Therapeutic levels of pain medication, especially oxycontin and synthetic  opioids, may not be detected by this Methodology. Pain management screen  panel  Drug panel-PM-Hi Res Ur, Interp (PAIN) should be considered for drug  monitoring \".  PCP Screen, Urine 09/18/2020 Neg  Negative <25 ng/mL Final    Methadone Screen, Urine 09/18/2020 Neg  Negative <300 ng/mL Final    Propoxyphene Scrn, Ur 09/18/2020 Neg  Negative <300 ng/mL Final    Oxycodone Urine 09/18/2020 Neg  Negative <100 ng/ml Final    pH, UA 09/18/2020 5.0   Final    Comment: Urine pH less than 5.0 or greater than 8.0 may indicate sample adulteration. Another sample should be collected if clinically  indicated.  Drug Screen Comment: 09/18/2020 see below   Final    Comment: This method is a screening test to detect only these drug  classes as part of a medical workup. Confirmatory testing  by another method should be ordered if clinically indicated.       Salicylate, Serum 51/59/6306 <0.3* 15.0 - 30.0 mg/dL Final    Comment: Therapeutic Range: 15.0-30.0 mg/dL  Toxic: >30.0 mg/dL      Ethanol Lvl 09/19/2020 65  mg/dL Final    Comment:    None Detected  Conversion factor:  100 mg/dl = .100 g/dl  For Medical Purposes Only      SARS-CoV-2, NAAT 09/19/2020 Not Detected  Not Detected Final    Comment: Rapid NAAT:   Negative results should be treated as presumptive and,  if inconsistent with clinical signs and symptoms or necessary for  patient management, should be tested with an alternative molecular  assay. Negative results do not preclude SARS-CoV-2 infection and  should not be used as the sole basis for patient management decisions. This test has been authorized by the FDA under an Emergency Use  Authorization (EUA) for use by authorized laboratories.     Fact sheet for Healthcare Providers:  BuildHer.es  Fact sheet for Patients: BuildHer.es    METHODOLOGY: Isothermal Nucleic Acid Amplification      TSH 09/19/2020 2.20  0.27 - 4.20 uIU/mL Final    Hemoglobin A1C 09/20/2020 5.4  See comment % Final    Comment: Comment:  Diagnosis of Diabetes: > or = 6.5%  Increased risk of diabetes (Prediabetes): 5.7-6.4%  Glycemic Control: Nonpregnant Adults: <7.0%                    Pregnant: <6.0%        eAG 09/20/2020 108.3  mg/dL Final    Cholesterol, Total 09/20/2020 191  0 - 199 mg/dL Final    Triglycerides 09/20/2020 205* 0 - 150 mg/dL Final    HDL 09/20/2020 40  40 - 60 mg/dL Final    LDL Calculated 09/20/2020 110* <100 mg/dL Final    VLDL Cholesterol Calculated 09/20/2020 41  Not Established mg/dL Final    Sodium 09/20/2020 135* 136 - 145 mmol/L Final    Potassium reflex Magnesium 09/20/2020 4.2  3.5 - 5.1 mmol/L Final    Chloride 09/20/2020 100  99 - 110 mmol/L Final    CO2 09/20/2020 28  21 - 32 mmol/L Final    Anion Gap 09/20/2020 7  3 - 16 Final    Glucose 09/20/2020 97  70 - 99 mg/dL Final    BUN 09/20/2020 12  7 - 20 mg/dL Final    CREATININE 09/20/2020 0.9  0.9 - 1.3 mg/dL Final    GFR Non- 09/20/2020 >60  >60 Final    Comment: >60 mL/min/1.73m2 EGFR, calc. for ages 25 and older using the  MDRD formula (not corrected for weight), is valid for stable  renal function.  GFR  09/20/2020 >60  >60 Final    Comment: Chronic Kidney Disease: less than 60 ml/min/1.73 sq.m. Kidney Failure: less than 15 ml/min/1.73 sq.m. Results valid for patients 18 years and older.  Calcium 09/20/2020 9.1  8.3 - 10.6 mg/dL Final    Total Protein 09/20/2020 7.4  6.4 - 8.2 g/dL Final    Alb 09/20/2020 4.6  3.4 - 5.0 g/dL Final    Albumin/Globulin Ratio 09/20/2020 1.6  1.1 - 2.2 Final    Total Bilirubin 09/20/2020 0.6  0.0 - 1.0 mg/dL Final    Alkaline Phosphatase 09/20/2020 81  40 - 129 U/L Final    ALT 09/20/2020 72* 10 - 40 U/L Final    AST 09/20/2020 29  15 - 37 U/L Final    Globulin 09/20/2020 2.8  g/dL Final            Medications  Current Facility-Administered Medications: acetaminophen (TYLENOL) tablet 650 mg, 650 mg, Oral, Q4H PRN  ibuprofen (ADVIL;MOTRIN) tablet 400 mg, 400 mg, Oral, Q6H PRN  sterile water injection 2.1 mL, 2.1 mL, Intramuscular, Q4H PRN  traZODone (DESYREL) tablet 50 mg, 50 mg, Oral, Nightly PRN  benztropine mesylate (COGENTIN) injection 2 mg, 2 mg, Intramuscular, BID PRN  magnesium hydroxide (MILK OF MAGNESIA) 400 MG/5ML suspension 30 mL, 30 mL, Oral, Daily PRN  aluminum & magnesium hydroxide-simethicone (MAALOX) 200-200-20 MG/5ML suspension 30 mL, 30 mL, Oral, Q6H PRN  escitalopram (LEXAPRO) tablet 10 mg, 10 mg, Oral, Daily    ASSESSMENT AND PLAN    Principal Problem:    Severe episode of recurrent major depressive disorder, without psychotic features ([address]  Active Problems:    Depression    Leucocytosis    Poison ivy dermatitis    Hypokalemia    Routine general medical examination at a health care facility    Elevated blood-pressure reading without diagnosis of hypertension    Transaminitis    Suicidal ideation  Resolved Problems:    * No resolved hospital problems. *       1. Patient s symptoms   are improving  2. Probable discharge is tomorrow  3. Discharge planning is incomplete  4. Suicidal ideation is better  5. Total time with patient was 40 minutes and more than 50 % of that time was spent counseling the patient on their symptoms, treatment and expected goals. Addendum to PA student note:  Pt seen, examined, and evaluated with PA student, Jt Walters, who acted as my scribe for the above documentation. I have reviewed the current history, physical findings, labs, assessment and plan; and agree with note as documented.      Delbert Nelson MD  Physician Psychiatry

## 2020-09-22 NOTE — BH NOTE
Group Therapy Note    Date: 9/21/2020  Start Time: 20:00  End Time:  21:00  Number of Participants: 12    Type of Group: Recreational  Wrap up    Willian Ansari Information  Module Name:  /  Session Number:  /    Patient's Goal:  No goal    Notes:  /    Status After Intervention:  Unchanged    Participation Level: Minimal    Participation Quality: Appropriate and Resistant      Speech:  pressured      Thought Process/Content: Perseverating      Affective Functioning: Blunted      Mood: depressed      Level of consciousness:  Alert, Oriented x4 and Attentive      Response to Learning: Able to change behavior      Endings: None Reported    Modes of Intervention: Socialization and Problem-solving      Discipline Responsible: Behavorial Health Tech      Signature:  Conrad Bernabe

## 2020-09-22 NOTE — PLAN OF CARE
Pt was isolative to room early in shift. Said staying in room because he fears being judged, that doesn't feel comfortable around others. Explained that groups are good place to learn coping skills and to give and get support, that realize many others have common px. He did attend group and stayed out watch movie,some socialization, less anxious and bored. Denied SI/HI. Denied hallucinations, paranoia. Hopes for discharge soon. Said decision to split up was mutual. He and wife are still friends. Stated that he moved into his parents' home for abt a month but was terrible situation. When his 3 kids visited parents yelled at them, tried to discipline/assume parental role. He got angry. Talked to wife and they decided that camper on property was best for all. Said that kids are happier since can see all the time. Said that both wife and he are ok with the other moving on to others, that he met her current BF. Asked abt 2 bottles of wine, why he drank so much. He has been seeing a woman and told her he is  because he thought she would not see him if he told her still . She disclosed that she is also still  also but has been  long time. Said he started feeling guilty for lying to her and feared that she would no longer want to see him so started drinking, wound up drinking too much and wound up here. He has since told her the truth and she is ok, will cont' relationship[facility] and go to counseling weekly after discharge. Pleasant.

## 2020-09-23 PROCEDURE — 99233 SBSQ HOSP IP/OBS HIGH 50: CPT | Performed by: PSYCHIATRY & NEUROLOGY

## 2020-09-23 PROCEDURE — 6370000000 HC RX 637 (ALT 250 FOR IP): Performed by: PSYCHIATRY & NEUROLOGY

## 2020-09-23 PROCEDURE — 1240000000 HC EMOTIONAL WELLNESS R&B

## 2020-09-23 RX ADMIN — ESCITALOPRAM OXALATE 10 MG: 10 TABLET ORAL at 08:33

## 2020-09-23 NOTE — PROGRESS NOTES
Department of Psychiatry  Physician Assistant Student Progress Note  Chief Complaint: \"gather coping skills for when I get discharged\"   Patient's chart was reviewed and collaborated with  about the treatment plan. SUBJECTIVE:    Patient is feeling better. He states while growing up with his parents, they were very strict,  people that did not let him live his life like a kid growing up. They tried to control everything and expected things to be done their way. Anytime he expressed his emotions he was viewed as \"weak\" and they would not let it down. He has since realized that this is not the way to live and treat his depression. He has been attending meetings recognizing that he and many of the patients have many similarities with what they are going through together. He states he looks forward to gaining \"tools\" from the different groups that he can utilize when he is discharged in order to take care of himself and be there for his children. He says he has a good support group including his new girlfriend, his soon to be ex-wife and her boyfriend. He enjoys welding and plans on doing more of that at home as a healthy coping mechanism and he wants to be able to express himself and his emotions openly with those in his support group. He recognizes that his parents are not apart of his support group nor does he think they will ever understand. He denies feeling sad, anhedonic, hopeless/worthless/guilty, having low energy or concentration, or SI/HI. He thinks he will be ready for discharge potentially tomorrow or the next day. Suicidal ideation:  denies suicidal ideation. Patient does not have medication side effects. ROS: Patient has new complaints: no  Sleeping adequately:  Yes   Appetite adequate:  Yes  Attending groups: Yes  Visitors:No    OBJECTIVE    Physical  VITALS:  /83   Pulse 79   Temp 98.6 °F (37 °C) (Temporal)   Resp 16   Ht 5' 9\" (1.753 m)   Wt 250 lb (113.4 kg) BUN 09/18/2020 8  7 - 20 mg/dL Final    CREATININE 09/18/2020 0.9  0.9 - 1.3 mg/dL Final    GFR Non- 09/18/2020 >60  >60 Final    Comment: >60 mL/min/1.73m2 EGFR, calc. for ages 25 and older using the  MDRD formula (not corrected for weight), is valid for stable  renal function.  GFR  09/18/2020 >60  >60 Final    Comment: Chronic Kidney Disease: less than 60 ml/min/1.73 sq.m. Kidney Failure: less than 15 ml/min/1.73 sq.m. Results valid for patients 18 years and older.  Calcium 09/18/2020 9.1  8.3 - 10.6 mg/dL Final    Total Protein 09/18/2020 8.7* 6.4 - 8.2 g/dL Final    Alb 09/18/2020 5.4* 3.4 - 5.0 g/dL Final    Albumin/Globulin Ratio 09/18/2020 1.6  1.1 - 2.2 Final    Total Bilirubin 09/18/2020 0.4  0.0 - 1.0 mg/dL Final    Alkaline Phosphatase 09/18/2020 92  40 - 129 U/L Final    ALT 09/18/2020 91* 10 - 40 U/L Final    AST 09/18/2020 44* 15 - 37 U/L Final    Globulin 09/18/2020 3.3  g/dL Final    Ethanol Lvl 09/18/2020 185  mg/dL Final    Comment:    None Detected  Conversion factor:  100 mg/dl = .100 g/dl  For Medical Purposes Only      Acetaminophen Level 09/18/2020 <5* 10 - 30 ug/mL Final    Comment: Therapeutic Range: 10.0-30.0 ug/mL  Toxic: >=150 ug/mL      Amphetamine Screen, Urine 09/18/2020 Neg  Negative <1000ng/mL Final    Barbiturate Screen, Ur 09/18/2020 Neg  Negative <200 ng/mL Final    Benzodiazepine Screen, Urine 09/18/2020 Neg  Negative <200 ng/mL Final    Cannabinoid Scrn, Ur 09/18/2020 Neg  Negative <50 ng/mL Final    Cocaine Metabolite Screen, Urine 09/18/2020 Neg  Negative <300 ng/mL Final    Opiate Scrn, Ur 09/18/2020 Neg  Negative <300 ng/mL Final    Comment: \"Therapeutic levels of pain medication, especially oxycontin and synthetic  opioids, may not be detected by this Methodology. Pain management screen  panel  Drug panel-PM-Hi Res Ur, Interp (PAIN) should be considered for drug  monitoring \".       PCP Screen, Urine 09/18/2020 Neg  Negative <25 ng/mL Final    Methadone Screen, Urine 09/18/2020 Neg  Negative <300 ng/mL Final    Propoxyphene Scrn, Ur 09/18/2020 Neg  Negative <300 ng/mL Final    Oxycodone Urine 09/18/2020 Neg  Negative <100 ng/ml Final    pH, UA 09/18/2020 5.0   Final    Comment: Urine pH less than 5.0 or greater than 8.0 may indicate sample adulteration. Another sample should be collected if clinically  indicated.  Drug Screen Comment: 09/18/2020 see below   Final    Comment: This method is a screening test to detect only these drug  classes as part of a medical workup. Confirmatory testing  by another method should be ordered if clinically indicated.  Salicylate, Serum 24/84/7660 <0.3* 15.0 - 30.0 mg/dL Final    Comment: Therapeutic Range: 15.0-30.0 mg/dL  Toxic: >30.0 mg/dL      Ethanol Lvl 09/19/2020 65  mg/dL Final    Comment:    None Detected  Conversion factor:  100 mg/dl = .100 g/dl  For Medical Purposes Only      SARS-CoV-2, NAAT 09/19/2020 Not Detected  Not Detected Final    Comment: Rapid NAAT:   Negative results should be treated as presumptive and,  if inconsistent with clinical signs and symptoms or necessary for  patient management, should be tested with an alternative molecular  assay. Negative results do not preclude SARS-CoV-2 infection and  should not be used as the sole basis for patient management decisions. This test has been authorized by the FDA under an Emergency Use  Authorization (EUA) for use by authorized laboratories.     Fact sheet for Healthcare Providers:  José Miguel.alison  Fact sheet for Patients: José Miguel.alison    METHODOLOGY: Isothermal Nucleic Acid Amplification      TSH 09/19/2020 2.20  0.27 - 4.20 uIU/mL Final    Hemoglobin A1C 09/20/2020 5.4  See comment % Final    Comment: Comment:  Diagnosis of Diabetes: > or = 6.5%  Increased risk of diabetes (Prediabetes): 5.7-6.4%  Glycemic Control: Nonpregnant mg, Oral, Nightly PRN  benztropine mesylate (COGENTIN) injection 2 mg, 2 mg, Intramuscular, BID PRN  magnesium hydroxide (MILK OF MAGNESIA) 400 MG/5ML suspension 30 mL, 30 mL, Oral, Daily PRN  aluminum & magnesium hydroxide-simethicone (MAALOX) 200-200-20 MG/5ML suspension 30 mL, 30 mL, Oral, Q6H PRN  escitalopram (LEXAPRO) tablet 10 mg, 10 mg, Oral, Daily    ASSESSMENT AND PLAN    Principal Problem:    Severe episode of recurrent major depressive disorder, without psychotic features ([facility] [address]  Active Problems:    Depression    Leucocytosis    Poison ivy dermatitis    Hypokalemia    Routine general medical examination at a health care facility    Elevated blood-pressure reading without diagnosis of hypertension    Transaminitis    Suicidal ideation  Resolved Problems:    * No resolved hospital problems. *       1. Patient s symptoms   are improving  2. Probable discharge is tomorrow  3. Discharge planning is incomplete  4. Suicidal ideation is better  5. Total time with patient was 40 minutes and more than 50 % of that time was spent counseling the patient on their symptoms, treatment and expected goals. Addendum to PA student note:  Pt seen, examined, and evaluated with PA student, Solomon Russo, who acted as my scribe for the above documentation. I have reviewed the current history, physical findings, labs, assessment and plan; and agree with note as documented.      Louise Stroud MD  Physician Psychiatry

## 2020-09-23 NOTE — GROUP NOTE
Group Therapy Note    Date: 9/23/2020    Group Start Time: 1110  Group End Time: 1150  Group Topic: Psychotherapy    [facility]    Carrie Levy, [facility]        Group Therapy Note    Attendees: 8         Patient's Goal:  Pt's goal was to express self appropriately. Notes:  Pt was engaged in group. Pt asked the group how to express self so other do not think he is mad or angry. Pt was open to the feedback from the group. Pt met goal.     Status After Intervention:  Improved    Participation Level:  Active Listener and Interactive    Participation Quality: Appropriate, Attentive, Sharing and Supportive      Speech:  normal      Thought Process/Content: Logical  Linear      Affective Functioning: Congruent      Mood: euthymic      Level of consciousness:  Alert, Oriented x4 and Attentive      Response to Learning: Able to verbalize current knowledge/experience, Able to verbalize/acknowledge new learning, Able to retain information, Capable of insight, Able to change behavior and Progressing to goal      Endings: None Reported    Modes of Intervention: Education, Support, Socialization, Exploration, Clarifying, Problem-solving, Activity, Movement, Confrontation, Limit-setting and Reality-testing      Discipline Responsible: /Counselor      Signature:  Carrie Levy, [facility]

## 2020-09-23 NOTE — GROUP NOTE
Group Therapy Note    Date: 9/23/2020    Group Start Time: 1000  Group End Time: 1100  Group Topic: Psychoeducation    [address] [facility]        Group Therapy Note    Group members educated on communication, differentiating between passive, assertive, and aggressive communication. Group members provided resources for identifying behaviors in self that fit into each designation, worksheet to explore relationships and which in their life elicit the most assertive communication. Patients processed avoidance mentality, identities of avoidance in assertiveness. Therapist facilitated role-playing exercises, provided situations and scenarios that require assertive communication. Attendees: 11         Patient's Goal:  Patient will identify positive communication strategies, reflect in personal uses of assertive communication, and engage in role-play exercises during group therapy. Notes:  Patient actively engaged across interventions, actively engaged with discussions, providing feedback and encouragement to multiple patients in their engagement, collaborative in role-plays exercises. Status After Intervention:  Improved    Participation Level:  Active Listener and Interactive    Participation Quality: Attentive, Sharing and Supportive      Speech:  normal      Thought Process/Content: Logical      Affective Functioning: Congruent      Mood: Positive      Level of consciousness:  Alert and Attentive      Response to Learning: Able to verbalize/acknowledge new learning      Endings: None Reported    Modes of Intervention: Education, Support, Socialization and Exploration      Discipline Responsible: Psychoeducational Specialist      Signature:  Christopher Martínez MM, MT-BC

## 2020-09-23 NOTE — PLAN OF CARE
Problem: Depressive Behavior With or Without Suicide Precautions:  Goal: Able to verbalize and/or display a decrease in depressive symptoms  Description: Able to verbalize and/or display a decrease in depressive symptoms  9/22/2020 2042 by Jazmyn Perez RN  Outcome: Ongoing     Problem: Depressive Behavior With or Without Suicide Precautions:  Goal: Ability to disclose and discuss suicidal ideas will improve  Description: Ability to disclose and discuss suicidal ideas will improve  9/22/2020 2042 by Jazmyn Perez RN  Outcome: Ongoing  9/22/2020 1804 by Palomo Martinez LPN  Outcome: Ongoing   Lindsay Mays was visible and social on the unit. He reported that he feels much better and is able to contract for safety. He denies any SI,HI or AVH.

## 2020-09-23 NOTE — GROUP NOTE
Group Therapy Note    Date: 9/23/2020    Group Start Time: 1310  Group End Time: 1400  Group Topic: [address], [employer]        Group Therapy Note    Attendees: 10         Patient's Goal:  Patient will complete worksheet on boundaries and will discuss how they apply to mental wellbeing. Notes:  Patient attended group. Completed the worksheet and discussed in group. Verbalized a basic understanding of boundaries and gave examples of poor and healthy boundaries. Status After Intervention:  Improved    Participation Level:  Active Listener and Interactive    Participation Quality: Appropriate and Attentive    Speech:  normal    Thought Process/Content: Logical    Affective Functioning: Congruent    Mood: anxious, depressed     Level of consciousness:  Oriented x4    Response to Learning: Able to verbalize current knowledge/experience and Able to verbalize/acknowledge new learning    Endings: None Reported    Modes of Intervention: Education, Support, Socialization and Exploration    Discipline Responsible: /Counselor    Signature:  Eulalia Lorenzo [employer]

## 2020-09-23 NOTE — GROUP NOTE
Group Therapy Note    Date: 9/23/2020    Group Start Time: 1600  Group End Time: 1700  Group Topic: Healthy Living/Wellness    Nidia Peña, RN        Group Therapy Note    Attendees: Students played health/wellness bingo         Patient's Goal:  To learn coping skills to help me calm down    Notes:  Patient calm and cooperative in group    Status After Intervention:  Improved    Participation Level:  Active Listener and Interactive    Participation Quality: Appropriate, Attentive and Sharing      Speech:  normal      Thought Process/Content: Logical  Linear      Affective Functioning: Congruent      Mood: euthymic      Level of consciousness:  Alert and Oriented x4      Response to Learning: Able to verbalize current knowledge/experience, Able to verbalize/acknowledge new learning and Able to retain information      Endings: None Reported    Modes of Intervention: Education, Support and Socialization      Discipline Responsible: Registered Nurse      Signature:  Celestine Ng RN

## 2020-09-24 VITALS
SYSTOLIC BLOOD PRESSURE: 119 MMHG | HEIGHT: 69 IN | BODY MASS INDEX: 37.03 KG/M2 | TEMPERATURE: 97.7 F | OXYGEN SATURATION: 98 % | DIASTOLIC BLOOD PRESSURE: 76 MMHG | HEART RATE: 78 BPM | RESPIRATION RATE: 16 BRPM | WEIGHT: 250 LBS

## 2020-09-24 PROCEDURE — 5130000000 HC BRIDGE APPOINTMENT

## 2020-09-24 PROCEDURE — 6370000000 HC RX 637 (ALT 250 FOR IP): Performed by: PSYCHIATRY & NEUROLOGY

## 2020-09-24 PROCEDURE — 99239 HOSP IP/OBS DSCHRG MGMT >30: CPT | Performed by: PSYCHIATRY & NEUROLOGY

## 2020-09-24 RX ORDER — ESCITALOPRAM OXALATE 10 MG/1
10 TABLET ORAL DAILY
Qty: 30 TABLET | Refills: 0 | Status: CANCELLED | OUTPATIENT
Start: 2020-09-25

## 2020-09-24 RX ORDER — ESCITALOPRAM OXALATE 10 MG/1
10 TABLET ORAL DAILY
Qty: 30 TABLET | Refills: 0 | Status: SHIPPED | OUTPATIENT
Start: 2020-09-25

## 2020-09-24 RX ADMIN — ESCITALOPRAM OXALATE 10 MG: 10 TABLET ORAL at 08:25

## 2020-09-24 ASSESSMENT — PAIN SCALES - GENERAL: PAINLEVEL_OUTOF10: 0

## 2020-09-24 NOTE — DISCHARGE SUMMARY
Discharge Summary   Admit Date: 9/18/2020   Discharge Date:    9/24/2020  Spent over 40 minutes with patient and staff on [facility]   Final Dx: axis I: Severe episode of recurrent major depressive disorder, without psychotic features ([address]   Axis 2: No diagnosis  [place] 3: See Medical History    And Present on Admission:   Depression   Leucocytosis   Poison ivy dermatitis   Hypokalemia   Routine general medical examination at a health care facility   Elevated blood-pressure reading without diagnosis of hypertension   Transaminitis   Severe episode of recurrent major depressive disorder, without psychotic features ([address]   Suicidal ideation     Axis 4: Other psychosocial and environmental problems  Axis 5:  On Admission: 11-20 some danger of hurting self or others possible OR occasionally fails to maintain minimal personal hygiene OR gross impairment in communication At Discharge: 61-70 mild symptoms   All conditions on Axis 1 and Axis 2 and active problems on Axis 3 were treated while patient was hospitalized.  [facility] Problems    Diagnosis Date Noted    Depression [F32.9] 09/19/2020    Leucocytosis [D72.829] 09/19/2020    Poison ivy dermatitis [L23.7] 09/19/2020    Hypokalemia [E87.6] 09/19/2020    Routine general medical examination at a health care facility [Z00.00] 09/19/2020    Elevated blood-pressure reading without diagnosis of hypertension [R03.0] 09/19/2020    Severe episode of recurrent major depressive disorder, without psychotic features ([address] [F33.2] 09/19/2020    Transaminitis [R74.0]     Suicidal ideation [R45.851]    )   Condition on DC  Mood and affect are stable and pt is not suicidal   VITALS:  /76   Pulse 78   Temp 97.7 °F (36.5 °C) (Temporal)   Resp 16   Ht 5' 9\" (1.753 m)   Wt 250 lb (113.4 kg)   SpO2 98%   BMI 36.92 kg/m²   Brief Summary Present Illness     CC:  Brought in by police for Intoxication and SI, states he feels \"uncomfortable\"     HPI: Patient seen in room on Adult Behavioral Unit. Patient is a domiciled, employed 37 y.o. male with a history of depression who was brought in by the police yesterday after his ex-wife called due to him shooting a firearm last night while intoxicated with wine. Per patient, the firearm was not directed towards anyone and he was just shooting out in the field. He denies intent to kill himself or his family members. He says that he and his wife are going through a divorce. He drank a large bottle of wine quickly last night and started on his second bottle when he was arrested. His firearms were confiscated. He felt his mood definitely worsened after drinking. He drinks about a bottle of wine twice a week to \"let loose. \" He was seeing his family provider who prescribed Zoloft which he discontinued about 2 months ago due to feeling lethargic and experiencing sexual dysfunction. He has tried Wellbutrin in the past which did not help. 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 Currently, he endorses feeling sad, feeling guilty with everything going on between him and his wife and putting his kids through this, feeling helpless, lower energy, decreased concentration, decreased appetite, and experiencing passive suicidal ideation. He denies anhedonia, plan or intent to kill himself, or HI. Per ED notes, his wife and friend had reported that he made a comment about ending his own life. When he and his wife were first going through the divorce, he tried living with his parents for a month, however, they were not very supportive and trying to tell him how he should do things. He decided to move in a camper on he and his ex-wife's property so that he could be closer to his kids and away from his parents. He denies any illicit drug use.      Hospital Course Patient stabilized on meds and milieu treatment.  He originally was not attending groups due to fear of judgement by others, but after some encouragement he decided to start going and found them very helpful. He was started on 10 mg of Lexapro. He originally had a \"fuzzy feeling int he back of his head\" but that has since resolved. He states he is planning on seeing a counselor/therapist regularly outside of here and following up with his PCP. Discussed the importance of compliance with medication and reporting any new side effects to PCP for management. He was prescribed trazodone PRN for sleep. He has not been needing trazodone during his stay. He says he has been eating well but states he has been tossing and turning a few times in the middle of the night due to being too hot. He denies anxiety symptoms, depression symptoms, SI/HI/AVH. Patient was discharged to home to continue recovery in the community.    PE: (reviewed) and labs (see medical H&PE)  Labs:    Admission on 09/18/2020   Component Date Value Ref Range Status    WBC 09/18/2020 18.4* 4.0 - 11.0 K/uL Final    RBC 09/18/2020 5.03  4.20 - 5.90 M/uL Final    Hemoglobin 09/18/2020 15.1  13.5 - 17.5 g/dL Final    Hematocrit 09/18/2020 45.1  40.5 - 52.5 % Final    MCV 09/18/2020 89.6  80.0 - 100.0 fL Final    MCH 09/18/2020 30.0  26.0 - 34.0 pg Final    MCHC 09/18/2020 33.5  31.0 - 36.0 g/dL Final    RDW 09/18/2020 13.4  12.4 - 15.4 % Final    Platelets 80/59/1540 342  135 - 450 K/uL Final    MPV 09/18/2020 7.7  5.0 - 10.5 fL Final    Neutrophils % 09/18/2020 71.0  % Final    Lymphocytes % 09/18/2020 18.8  % Final    Monocytes % 09/18/2020 6.3  % Final    Eosinophils % 09/18/2020 2.9  % Final    Basophils % 09/18/2020 1.0  % Final    Neutrophils Absolute 09/18/2020 13.1* 1.7 - 7.7 K/uL Final    Lymphocytes Absolute 09/18/2020 3.5  1.0 - 5.1 K/uL Final    Monocytes Absolute 09/18/2020 1.2  0.0 - 1.3 K/uL Final    Eosinophils Absolute 09/18/2020 0.5  0.0 - 0.6 K/uL Final    Basophils Absolute 09/18/2020 0.2  0.0 - 0.2 K/uL Final    Sodium 09/18/2020 134* 136 - 145 mmol/L Final    Potassium 09/18/2020 3.4* 3.5 - 5.1 mmol/L Final    Chloride 09/18/2020 98* 99 - 110 mmol/L Final    CO2 09/18/2020 20* 21 - 32 mmol/L Final    Anion Gap 09/18/2020 16  3 - 16 Final    Glucose 09/18/2020 110* 70 - 99 mg/dL Final    BUN 09/18/2020 8  7 - 20 mg/dL Final    CREATININE 09/18/2020 0.9  0.9 - 1.3 mg/dL Final    GFR Non- 09/18/2020 >60  >60 Final    Comment: >60 mL/min/1.73m2 EGFR, calc. for ages 25 and older using the  MDRD formula (not corrected for weight), is valid for stable  renal function.  GFR  09/18/2020 >60  >60 Final    Comment: Chronic Kidney Disease: less than 60 ml/min/1.73 sq.m. Kidney Failure: less than 15 ml/min/1.73 sq.m. Results valid for patients 18 years and older.       Calcium 09/18/2020 9.1  8.3 - 10.6 mg/dL Final    Total Protein 09/18/2020 8.7* 6.4 - 8.2 g/dL Final    Alb 09/18/2020 5.4* 3.4 - 5.0 g/dL Final    Albumin/Globulin Ratio 09/18/2020 1.6  1.1 - 2.2 Final    Total Bilirubin 09/18/2020 0.4  0.0 - 1.0 mg/dL Final    Alkaline Phosphatase 09/18/2020 92  40 - 129 U/L Final    ALT 09/18/2020 91* 10 - 40 U/L Final    AST 09/18/2020 44* 15 - 37 U/L Final    Globulin 09/18/2020 3.3  g/dL Final    Ethanol Lvl 09/18/2020 185  mg/dL Final    Comment:    None Detected  Conversion factor:  100 mg/dl = .100 g/dl  For Medical Purposes Only      Acetaminophen Level 09/18/2020 <5* 10 - 30 ug/mL Final    Comment: Therapeutic Range: 10.0-30.0 ug/mL  Toxic: >=150 ug/mL      Amphetamine Screen, Urine 09/18/2020 Neg  Negative <1000ng/mL Final    Barbiturate Screen, Ur 09/18/2020 Neg  Negative <200 ng/mL Final    Benzodiazepine Screen, Urine 09/18/2020 Neg  Negative <200 ng/mL Final    Cannabinoid Scrn, Ur 09/18/2020 Neg  Negative <50 ng/mL Final    Cocaine Metabolite Screen, Urine 09/18/2020 Neg  Negative <300 ng/mL Final    Opiate Scrn, Ur 09/18/2020 Neg  Negative <300 ng/mL Final    Comment: \"Therapeutic levels of pain medication, especially oxycontin and synthetic  opioids, may not be detected by this Methodology. Pain management screen  panel  Drug panel-PM-Hi Res Ur, Interp (PAIN) should be considered for drug  monitoring \".  PCP Screen, Urine 09/18/2020 Neg  Negative <25 ng/mL Final    Methadone Screen, Urine 09/18/2020 Neg  Negative <300 ng/mL Final    Propoxyphene Scrn, Ur 09/18/2020 Neg  Negative <300 ng/mL Final    Oxycodone Urine 09/18/2020 Neg  Negative <100 ng/ml Final    pH, UA 09/18/2020 5.0   Final    Comment: Urine pH less than 5.0 or greater than 8.0 may indicate sample adulteration. Another sample should be collected if clinically  indicated.  Drug Screen Comment: 09/18/2020 see below   Final    Comment: This method is a screening test to detect only these drug  classes as part of a medical workup. Confirmatory testing  by another method should be ordered if clinically indicated.  Salicylate, Serum 93/97/6421 <0.3* 15.0 - 30.0 mg/dL Final    Comment: Therapeutic Range: 15.0-30.0 mg/dL  Toxic: >30.0 mg/dL      Ethanol Lvl 09/19/2020 65  mg/dL Final    Comment:    None Detected  Conversion factor:  100 mg/dl = .100 g/dl  For Medical Purposes Only      SARS-CoV-2, NAAT 09/19/2020 Not Detected  Not Detected Final    Comment: Rapid NAAT:   Negative results should be treated as presumptive and,  if inconsistent with clinical signs and symptoms or necessary for  patient management, should be tested with an alternative molecular  assay. Negative results do not preclude SARS-CoV-2 infection and  should not be used as the sole basis for patient management decisions. This test has been authorized by the FDA under an Emergency Use  Authorization (EUA) for use by authorized laboratories.     Fact sheet for Healthcare Providers:  José Miguel.es  Fact sheet for Patients: José Miguel.es    METHODOLOGY: Isothermal Nucleic Acid Discharge:  Level of consciousness:  awake  Appearance:  well-appearing, in chair, good grooming and good hygiene well-developed, well-nourished  Behavior/Motor:  no abnormalities noted normal gait and station AIMS: 0  Attitude toward examiner:  cooperative, attentive and good eye contact  Speech:  spontaneous, normal rate, normal volume and well articulated  Mood:  \"good\"   Affect:  mood congruent Anxiety: none  Hallucinations: Absent  Thought processes:  coherent Attention span, Concentration & Attention:  attention span and concentration were age appropriate  Thought content:  no evidence of delusions OCD: none    Insight: normal insight and judgment Cognition:  oriented to person, place, and time  Fund of Knowledge: average  IQ:average Memory: intact  Suicide:  No specific plan to harm self  Sleep: sleeps through the night  Appetite: ok   Reassess Jocelyn Risk:  no specific plan to harm self Pt has phone numbers to contact if suicidal thoughts recur and states pt will return to the hospital if suicidal feelings return. Hospital Routine Meds:     escitalopram  10 mg Oral Daily      Hospital PRN Meds: acetaminophen, ibuprofen, sterile water, traZODone, benztropine mesylate, magnesium hydroxide, aluminum & magnesium hydroxide-simethicone   Discharge Meds:    Current Discharge Medication List           Details   methylPREDNISolone (MEDROL DOSEPACK) 4 MG tablet TAKE AS DIRECTED      sertraline (ZOLOFT) 50 MG tablet Take 50 mg by mouth daily      sildenafil (VIAGRA) 100 MG tablet Take 50 mg by mouth daily as needed      fluticasone (FLONASE) 50 MCG/ACT nasal spray 2 sprays by Nasal route daily      buPROPion (WELLBUTRIN) 75 MG tablet Take 75 mg by mouth 2 times daily             Disposition - Residence Home or Self Care      Follow Up:  See Discharge Instructions     Addendum to PA student note:  Pt seen, examined, and evaluated with PA student, Jennifer Kwong, who acted as my scribe for the above documentation.  I have reviewed the current history, physical findings, labs, assessment and plan; and agree with note as documented.      Timoteo Manriquez MD  Physician Psychiatry

## 2020-09-24 NOTE — BH NOTE
Patient will be discharged per Psychiatrist order. He denies being suicidal and states he is excited to be discharged and see his children. He is brightened and his mood is improved. Denies concerns and feels Lexapro is working.

## 2020-09-24 NOTE — GROUP NOTE
Group Therapy Note    Date: 9/23/2020    Group Start Time: 2030  Group End Time: 2100  Group Topic: Donato Bruner, GEREMIAS        Group Therapy Note    Attendees: reviewed goals for the day and unit rules         Patient's Goal:  To go to group and learn coping skills    Notes:  Patient calm and cooperative on unit    Status After Intervention:  Improved    Participation Level:  Active Listener and Interactive    Participation Quality: Appropriate, Attentive and Sharing      Speech:  normal      Thought Process/Content: Logical  Linear      Affective Functioning: Congruent      Mood: euthymic      Level of consciousness:  Alert and Oriented x4      Response to Learning: Able to verbalize current knowledge/experience, Able to verbalize/acknowledge new learning and Able to retain information      Endings: None Reported    Modes of Intervention: Education, Support and Socialization      Discipline Responsible: Registered Nurse      Signature:  Km Henderson RN

## 2020-09-24 NOTE — GROUP NOTE
Group Therapy Note    Date: 9/24/2020    Group Start Time: 1000  Group End Time: 1050  Group Topic: [address]        Group Therapy Note    Attendees: 9    Patient's Goal: to engage in Blackout Songwriting intervention to increase self-expression, decrease feelings of isolation, and increase cognitive stimulation. Notes:  Elda Gong actively engaged in group throughout. Pt declined to share work with peers, but appropriately provided feedback to peers and actively listened. Elda Beltran participated in processing discussion. Status After Intervention:  Improved    Participation Level:  Active Listener and Interactive    Participation Quality: Appropriate, Attentive and Supportive      Speech:  normal      Thought Process/Content: Logical  Linear      Affective Functioning: Congruent      Mood: anxious and depressed      Level of consciousness:  Alert, Oriented x4 and Attentive      Response to Learning: Able to verbalize current knowledge/experience, Able to verbalize/acknowledge new learning and Progressing to goal      Endings: None Reported    Modes of Intervention: Education, Support, Socialization, Problem-solving, Activity and Media      Discipline Responsible: Psychoeducational Specialist      Signature:  KASEY Guthrie

## 2020-09-24 NOTE — PLAN OF CARE
Problem: Depressive Behavior With or Without Suicide Precautions:  Goal: Ability to disclose and discuss suicidal ideas will improve  Description: Ability to disclose and discuss suicidal ideas will improve  9/23/2020 2124 by Michael Evangelista RN  Outcome: Ongoing     Problem: Depressive Behavior With or Without Suicide Precautions:  Goal: Able to verbalize and/or display a decrease in depressive symptoms  Description: Able to verbalize and/or display a decrease in depressive symptoms  9/23/2020 2124 by Michael Evangelista RN  Outcome: Ongoing   Patient bright, visible and social on unit. Patient denies SI/HI. Patient working the program. Goal was to learn new coping skills to deal with depression and anxiety. Patient says that removing himself from negative situations is one way he is able to reduce his anxiety. Compliant with medications, sleep is good. No issues on unit. Patient displaying safe behaviors on unit.

## 2020-09-24 NOTE — BH NOTE
[address] [facility]    Pt discharged with followings belongings:   Dentures: None  Vision - Corrective Lenses: None  Hearing Aid: None  Jewelry: None  Body Piercings Removed: N/A  Clothing: Pants, Shirt, Footwear  Were All Patient Medications Collected?: Not Applicable  Other Valuables: Cell phone, Wallet(61.00, sunglasses)   Valuables sent home with patient. Valuables retrieved from safe and returned to patient. Patient education on aftercare instructions: yes. Information faxed to PCP and [facility] by this writer. Patient verbalize understanding of AVS:  yes. Status EXAM upon discharge:  Status and Exam  Normal: Yes  Facial Expression: Brightened  Affect: Appropriate, Congruent  Level of Consciousness: Alert  Mood:Normal: Yes  Mood: Anxious  Motor Activity:Normal: Yes  Motor Activity: Decreased  Interview Behavior: Cooperative  Preception: [place] to Person, Jamshid Hastings to Time, [place] to Place, [place] to Situation  Attention:Normal: Yes  Thought Processes: (logical, linear)  Thought Content:Normal: Yes  Thought Content: Preoccupations  Hallucinations: None  Delusions: No  Memory:Normal: Yes  Insight and Judgment: Yes  Insight and Judgment: Poor Insight  Present Suicidal Ideation: No  Present Homicidal Ideation: No      Metabolic Screening:    Lab Results   Component Value Date    LABA1C 5.4 09/20/2020       Lab Results   Component Value Date    CHOL 191 09/20/2020     Lab Results   Component Value Date    TRIG 205 (H) 09/20/2020     Lab Results   Component Value Date    HDL 40 09/20/2020     No components found for: [facility]  Lab Results   Component Value Date    LABVLDL 41 09/20/2020       Sandoval Oreilly RN    Bridge Appointment completed: Reviewed Discharge Instructions with patient. Patient verbalizes understanding and agreement with the discharge plan using the teachback method.      Referral for Outpatient Tobacco Cessation Counseling, upon discharge (negra X if applicable and completed):    ( )  Hospital staff assisted patient to call Quit Line or faxed referral                   during hospitalization                  (X)  Recognizing danger situations (included triggers and roadblocks), if not completed on admission                    (X)  Coping skills (new ways to manage stress, exercise, relaxation techniques, changing routine, distraction), if not completed on admission          (X)  Basic information about quitting (benefits of quitting, techniques in how to quit, available resources, if not completed on admission  ( ) Referral for counseling faxed to [facility]   ( ) Patient refused referral  ( ) Patient refused counseling  ( ) Patient refused smoking cessation medication upon discharge

## 2020-10-19 PROBLEM — Z00.00 ROUTINE GENERAL MEDICAL EXAMINATION AT A HEALTH CARE FACILITY: Status: RESOLVED | Noted: 2020-09-19 | Resolved: 2020-10-19

## 2021-07-09 ENCOUNTER — HOSPITAL ENCOUNTER (EMERGENCY)
Age: 36
Discharge: HOME | End: 2021-07-09
Attending: EMERGENCY MEDICINE
Payer: COMMERCIAL

## 2021-07-09 VITALS
HEART RATE: 93 BPM | WEIGHT: 220 LBS | TEMPERATURE: 98 F | HEIGHT: 70 IN | RESPIRATION RATE: 18 BRPM | SYSTOLIC BLOOD PRESSURE: 132 MMHG | BODY MASS INDEX: 31.5 KG/M2 | OXYGEN SATURATION: 98 % | DIASTOLIC BLOOD PRESSURE: 96 MMHG
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
## 2021-07-09 DIAGNOSIS — S60.459A FOREIGN BODY OF FINGER OF LEFT HAND, INITIAL ENCOUNTER: Primary | ICD-10-CM

## 2021-07-09 PROCEDURE — 6370000000 HC RX 637 (ALT 250 FOR IP): Performed by: EMERGENCY MEDICINE

## 2021-07-09 PROCEDURE — 10120 INC&RMVL FB SUBQ TISS SMPL: CPT

## 2021-07-09 PROCEDURE — 99285 EMERGENCY DEPT VISIT HI MDM: CPT

## 2021-07-09 RX ORDER — CEFDINIR 300 MG/1
300 CAPSULE ORAL 2 TIMES DAILY
Qty: 20 CAPSULE | Refills: 0 | Status: SHIPPED | OUTPATIENT
Start: 2021-07-09 | End: 2021-07-19

## 2021-07-09 RX ORDER — CEFDINIR 300 MG/1
300 CAPSULE ORAL ONCE
Status: COMPLETED | OUTPATIENT
Start: 2021-07-09 | End: 2021-07-09

## 2021-07-09 RX ADMIN — CEFDINIR 300 MG: 300 CAPSULE ORAL at 23:35

## 2021-07-09 ASSESSMENT — PAIN DESCRIPTION - LOCATION: LOCATION: FINGER (COMMENT WHICH ONE)

## 2021-07-09 ASSESSMENT — PAIN DESCRIPTION - ORIENTATION: ORIENTATION: LEFT

## 2021-07-09 ASSESSMENT — PAIN SCALES - GENERAL: PAINLEVEL_OUTOF10: 4

## 2021-07-09 ASSESSMENT — PAIN DESCRIPTION - PAIN TYPE: TYPE: ACUTE PAIN

## 2021-07-09 ASSESSMENT — PAIN DESCRIPTION - DESCRIPTORS: DESCRIPTORS: ACHING

## 2021-07-10 NOTE — ED PROVIDER NOTES
Disp-30 tablet,R-0Normal               ALLERGIES     Patient has no known allergies. FAMILYHISTORY     History reviewed. No pertinent family history. SOCIAL HISTORY       Social History     Socioeconomic History    Marital status:      Spouse name: None    Number of children: None    Years of education: None    Highest education level: None   Occupational History    None   Tobacco Use    Smoking status: Current Every Day Smoker     Packs/day: 1.00    Smokeless tobacco: Never Used   Vaping Use    Vaping Use: Never used   Substance and Sexual Activity    Alcohol use: Yes     Comment: occasionally    Drug use: No    Sexual activity: Yes     Partners: Female   Other Topics Concern    None   Social History Narrative    None     Social Determinants of Health     Financial Resource Strain:     Difficulty of Paying Living Expenses:    Food Insecurity:     Worried About Running Out of Food in the Last Year:     Ran Out of Food in the Last Year:    Transportation Needs:     Lack of Transportation (Medical):      Lack of Transportation (Non-Medical):    Physical Activity:     Days of Exercise per Week:     Minutes of Exercise per Session:    Stress:     Feeling of Stress :    Social Connections:     Frequency of Communication with Friends and Family:     Frequency of Social Gatherings with Friends and Family:     Attends Orthodox Services:     Active Member of Clubs or Organizations:     Attends Club or Organization Meetings:     Marital Status:    Intimate Partner Violence:     Fear of Current or Ex-Partner:     Emotionally Abused:     Physically Abused:     Sexually Abused:        SCREENINGS    [place] Coma Scale  Eye Opening: Spontaneous  Best Verbal Response: Oriented  Best Motor Response: Obeys commands  [place] Coma Scale Score: 15        PHYSICAL EXAM    (up to 7 for level 4, 8 or more for level 5)     ED Triage Vitals [07/09/21 2306]   BP Temp Temp Source Pulse Resp SpO2 Referral  Location:     Location:  Finger    Finger location:  L little finger    Depth:  Subcutaneous    Tendon involvement:  None  Pre-procedure details:     Imaging:  None    Neurovascular status: intact      Preparation: Patient was prepped and draped in usual sterile fashion    Anesthesia (see MAR for exact dosages): Anesthesia method:  Nerve block    Block location:  Left little finger    Block needle gauge:  27 G    Block anesthetic:  Lidocaine 2% w/o epi    Block technique:  Digital    Block injection procedure:  Anatomic landmarks identified, introduced needle, incremental injection, negative aspiration for blood and anatomic landmarks palpated    Block outcome:  Anesthesia achieved  Procedure type:     Procedure complexity:  Simple  Procedure details:     Scalpel size:  11    Incision length:  2mm    Localization method:  Visualized    Dissection of underlying tissues: yes      Bloodless field: no      Removal mechanism:  Hemostat    Foreign bodies recovered:  1    Description:  Fish fin andressa    Intact foreign body removal: yes    Post-procedure details:     Neurovascular status: intact      Confirmation:  No additional foreign bodies on visualization    Skin closure:  None    Dressing:  Antibiotic ointment and bulky dressing    Patient tolerance of procedure: Tolerated well, no immediate complications        CRITICAL CARE TIME   N/A    CONSULTS:  None      EMERGENCY DEPARTMENT COURSE and DIFFERENTIAL DIAGNOSIS/MDM:   Vitals:    Vitals:    07/09/21 2306   BP: (!) 132/96   Pulse: 93   Resp: 18   Temp: 98 °F (36.7 °C)   TempSrc: Oral   SpO2: 98%   Weight: 220 lb (99.8 kg)   Height: 5' 10\" (1.778 m)       Patient was given the following medications:  Medications   cefdinir (OMNICEF) capsule 300 mg (300 mg Oral Given 7/9/21 1765)     Patient is a 72-year-old right-hand-dominant male who has a catfish andressa stuck in his left pinky finger. This was removed as per the procedure note above.   Given that this

## 2021-07-10 NOTE — ED NOTES
Cleaned and dressed wound at this time and applied bacitracin and bulky dressing.      Joaquina Castorena RN  07/09/21 6771

## 2025-01-14 NOTE — PROGRESS NOTES
of care.    Regarding my note:  -This note was composed (by me only and not with assistance via a scribe) to the best of my knowledge and recollection of the encounter with the patient using one of my own customized note templates utilizing a combination of typing and dictating with the [employer] speech recognition software.  As a result, the note may possibly contain various errors (e.g. spelling, grammar, and non-sensible words/phrases/statements) despite reviewing the note prior to signing it for completion.      Time spent includes some or all of the following, both face-to-face time and non face-to-face time, but is not limited to:  [x] Preparing to see the patient by reviewing medical records available (notes, labs, imaging, etc.) prior to seeing the patient.  [x] Obtaining and/or reviewing the history from the patient.  [x] Performing a medically appropriate examination.  [x] Ordering of relevant lab work, medications, referrals, or procedures.  [x] Discussing patient's medical issues and formulating an assessment and plan.   [x] Reviewing plan of care with patient.  Answering any questions or concerns.   [x] Documentation within the electronic health record (EHR).  [] Reviewing records of history relevant to patient's issues after seeing the patient.  [] Discussion or coordination of care with other health care professionals.  [] An  was used during today's visit.  Care and attention was made to make a conscious effort to speak in short, comprehensible phrases.  I had to (at times) repeat or rephrase what I had said to the patient and allowed ample time for both patient and  to speak without interruption.    [] Other:     Arun Ward M.D.  Family Medicine  [facility]    Electronically signed by Arun Ward MD on 1/22/2025 at 5:32 PM.

## 2025-01-22 ENCOUNTER — OFFICE VISIT (OUTPATIENT)
Dept: FAMILY MEDICINE CLINIC | Age: 40
End: 2025-01-22
Payer: COMMERCIAL

## 2025-01-22 VITALS
HEART RATE: 85 BPM | WEIGHT: 246 LBS | DIASTOLIC BLOOD PRESSURE: 80 MMHG | HEIGHT: 70 IN | OXYGEN SATURATION: 98 % | SYSTOLIC BLOOD PRESSURE: 124 MMHG | BODY MASS INDEX: 35.22 KG/M2

## 2025-01-22 DIAGNOSIS — Z63.4 BEREAVEMENT DUE TO LIFE EVENT: ICD-10-CM

## 2025-01-22 DIAGNOSIS — Z71.9 HEALTH EDUCATION/COUNSELING: ICD-10-CM

## 2025-01-22 DIAGNOSIS — F17.200 CURRENT SMOKER: ICD-10-CM

## 2025-01-22 DIAGNOSIS — Z76.89 ENCOUNTER TO ESTABLISH CARE WITH NEW DOCTOR: Primary | ICD-10-CM

## 2025-01-22 DIAGNOSIS — Z13.228 SCREENING FOR ENDOCRINE, METABOLIC AND IMMUNITY DISORDER: ICD-10-CM

## 2025-01-22 DIAGNOSIS — N52.9 ERECTILE DYSFUNCTION, UNSPECIFIED ERECTILE DYSFUNCTION TYPE: ICD-10-CM

## 2025-01-22 DIAGNOSIS — Z13.0 SCREENING FOR ENDOCRINE, METABOLIC AND IMMUNITY DISORDER: ICD-10-CM

## 2025-01-22 DIAGNOSIS — Z13.29 SCREENING FOR ENDOCRINE, METABOLIC AND IMMUNITY DISORDER: ICD-10-CM

## 2025-01-22 DIAGNOSIS — Z00.00 ENCOUNTER FOR PREVENTATIVE ADULT HEALTH CARE EXAMINATION: ICD-10-CM

## 2025-01-22 PROCEDURE — 99385 PREV VISIT NEW AGE 18-39: CPT | Performed by: FAMILY MEDICINE

## 2025-01-22 RX ORDER — SILDENAFIL 100 MG/1
100 TABLET, FILM COATED ORAL DAILY PRN
Qty: 30 TABLET | Refills: 11 | Status: SHIPPED | OUTPATIENT
Start: 2025-01-22

## 2025-01-22 SDOH — ECONOMIC STABILITY: FOOD INSECURITY: WITHIN THE PAST 12 MONTHS, YOU WORRIED THAT YOUR FOOD WOULD RUN OUT BEFORE YOU GOT MONEY TO BUY MORE.: NEVER TRUE

## 2025-01-22 SDOH — ECONOMIC STABILITY: FOOD INSECURITY: WITHIN THE PAST 12 MONTHS, THE FOOD YOU BOUGHT JUST DIDN'T LAST AND YOU DIDN'T HAVE MONEY TO GET MORE.: NEVER TRUE

## 2025-01-22 SDOH — SOCIAL STABILITY - SOCIAL INSECURITY: DISSAPEARANCE AND DEATH OF FAMILY MEMBER: Z63.4

## 2025-01-22 ASSESSMENT — PATIENT HEALTH QUESTIONNAIRE - PHQ9
7. TROUBLE CONCENTRATING ON THINGS, SUCH AS READING THE NEWSPAPER OR WATCHING TELEVISION: NOT AT ALL
2. FEELING DOWN, DEPRESSED OR HOPELESS: NOT AT ALL
SUM OF ALL RESPONSES TO PHQ QUESTIONS 1-9: 0
4. FEELING TIRED OR HAVING LITTLE ENERGY: NOT AT ALL
8. MOVING OR SPEAKING SO SLOWLY THAT OTHER PEOPLE COULD HAVE NOTICED. OR THE OPPOSITE, BEING SO FIGETY OR RESTLESS THAT YOU HAVE BEEN MOVING AROUND A LOT MORE THAN USUAL: NOT AT ALL
SUM OF ALL RESPONSES TO PHQ QUESTIONS 1-9: 0
1. LITTLE INTEREST OR PLEASURE IN DOING THINGS: NOT AT ALL
6. FEELING BAD ABOUT YOURSELF - OR THAT YOU ARE A FAILURE OR HAVE LET YOURSELF OR YOUR FAMILY DOWN: NOT AT ALL
3. TROUBLE FALLING OR STAYING ASLEEP: NOT AT ALL
5. POOR APPETITE OR OVEREATING: NOT AT ALL
SUM OF ALL RESPONSES TO PHQ QUESTIONS 1-9: 0
SUM OF ALL RESPONSES TO PHQ QUESTIONS 1-9: 0
9. THOUGHTS THAT YOU WOULD BE BETTER OFF DEAD, OR OF HURTING YOURSELF: NOT AT ALL
SUM OF ALL RESPONSES TO PHQ9 QUESTIONS 1 & 2: 0
10. IF YOU CHECKED OFF ANY PROBLEMS, HOW DIFFICULT HAVE THESE PROBLEMS MADE IT FOR YOU TO DO YOUR WORK, TAKE CARE OF THINGS AT HOME, OR GET ALONG WITH OTHER PEOPLE: NOT DIFFICULT AT ALL

## 2025-01-22 ASSESSMENT — ENCOUNTER SYMPTOMS
ABDOMINAL DISTENTION: 0
BLOOD IN STOOL: 0
SHORTNESS OF BREATH: 0
ABDOMINAL PAIN: 0
VOMITING: 0
COUGH: 0
SINUS PRESSURE: 0
CHEST TIGHTNESS: 0
DIARRHEA: 0
TROUBLE SWALLOWING: 0
RHINORRHEA: 0
CONSTIPATION: 0
BACK PAIN: 0
NAUSEA: 0
WHEEZING: 0